# Patient Record
Sex: FEMALE | Race: WHITE | NOT HISPANIC OR LATINO | Employment: OTHER | RURAL
[De-identification: names, ages, dates, MRNs, and addresses within clinical notes are randomized per-mention and may not be internally consistent; named-entity substitution may affect disease eponyms.]

---

## 2021-01-12 ENCOUNTER — OFFICE VISIT CONVERTED (OUTPATIENT)
Dept: CARDIOLOGY | Facility: CLINIC | Age: 86
End: 2021-01-12
Attending: INTERNAL MEDICINE

## 2021-01-12 ENCOUNTER — CONVERSION ENCOUNTER (OUTPATIENT)
Dept: OTHER | Facility: HOSPITAL | Age: 86
End: 2021-01-12

## 2021-02-23 ENCOUNTER — OFFICE VISIT CONVERTED (OUTPATIENT)
Dept: CARDIOLOGY | Facility: CLINIC | Age: 86
End: 2021-02-23
Attending: INTERNAL MEDICINE

## 2021-02-23 ENCOUNTER — CONVERSION ENCOUNTER (OUTPATIENT)
Dept: OTHER | Facility: HOSPITAL | Age: 86
End: 2021-02-23

## 2021-03-23 ENCOUNTER — OFFICE VISIT CONVERTED (OUTPATIENT)
Dept: CARDIOLOGY | Facility: CLINIC | Age: 86
End: 2021-03-23
Attending: INTERNAL MEDICINE

## 2021-05-14 VITALS
SYSTOLIC BLOOD PRESSURE: 206 MMHG | HEART RATE: 68 BPM | HEIGHT: 66 IN | DIASTOLIC BLOOD PRESSURE: 106 MMHG | WEIGHT: 196 LBS | BODY MASS INDEX: 31.5 KG/M2

## 2021-05-14 VITALS
DIASTOLIC BLOOD PRESSURE: 85 MMHG | BODY MASS INDEX: 31.34 KG/M2 | WEIGHT: 195 LBS | SYSTOLIC BLOOD PRESSURE: 140 MMHG | HEART RATE: 62 BPM | HEIGHT: 66 IN

## 2021-05-14 VITALS
BODY MASS INDEX: 31.5 KG/M2 | DIASTOLIC BLOOD PRESSURE: 78 MMHG | SYSTOLIC BLOOD PRESSURE: 151 MMHG | HEIGHT: 66 IN | HEART RATE: 63 BPM | WEIGHT: 196 LBS

## 2021-05-14 NOTE — PROGRESS NOTES
Progress Note      Patient Name: Sol Jung   Patient ID: [id number]   Sex: Female   YOB: 1932    Primary Care Provider: Gissell OCHOA   Referring Provider: Gissell OCHOA    Visit Date: March 23, 2021    Provider: Ammon Gordon MD   Location: [facility]   Location Address: [address] [place] [address]  [id number]          History Of Present Illness  REFERRING CARE PROVIDER: Sarah Macias MD   Sol Jung is a 88 year old female who I saw in the office today for followup evaluation and management of shortness of breath, lower extremity edema, hypertension, and paroxysmal atrial fibrillation. Since the last visit, Sol had pneumonia and was briefly hospitalized at [facility]. She is recovering from this. She feels well. Recently her blood pressure has been better controlled by her home readings. She is compliant with her medications.   PAST MEDICAL HISTORY: Obesity; Hypertension; Paroxysmal atrial fibrillation; Pulmonary hypertension based on previous echocardiogram; GERD; Hysterectomy; Cholecystectomy; Shoulder surgery; back surgery; Esophagus; Hip surgery   PSYCHOSOCIAL HISTORY: Denies alcohol use.   CURRENT MEDICATIONS: Bumex 2 mg p.r.n.; benazepril 40 mg b.i.d.; metoprolol tartrate 50 mg b.i.d.; trazodone 50 mg q. h.s.; Flecainide 50 mg b.i.d.; aspirin 81 mg daily; Macrobid 100 mg 2-3x weekly; citalopram 20 mg daily; Centrum Silver daily; vitamin B12 1,000 mcg daily; hydralazine 50 mg t.i.d.; clonidine 0.1 mg p.r.n. started by the emergency room.      ALLERGIES: No known drug allergies.       Review of Systems  · Cardiovascular  o Admits  o : swelling (feet, ankles, hands), shortness of breath while walking or lying flat  o Denies  o : palpitations (fast, fluttering, or skipping beats), chest pain or angina pectoris   · Respiratory  o Admits  o : chronic or frequent cough      Vitals  Date Time BP Position Site L\R Cuff Size HR RR  "TEMP (F) WT  HT  BMI kg/m2 BSA m2 O2 Sat FR L/min FiO2 HC       03/23/2021 11:38 /85 Sitting    62 - R   194lbs 16oz 5'  6\" 31.47 2.03             Physical Examination  · Constitutional  o Appearance  o : Obese elderly white female, pleasant, in no acute distress.  · Respiratory  o Auscultation of Lungs  o : Clear to auscultation bilaterally. No crackles or rhonchi.  · Cardiovascular  o Heart  o : S1, S2 is normally heard. No S3. No murmur, rubs, or gallops.  · Gastrointestinal  o Abdominal Examination  o : Soft, nontender, nondistended. No free fluid. Bowel sounds heard in all four quadrants.  · Extremities  o Extremities  o : She has 1+ lower extremity edema.   · Labs  o Labs  o : Her laboratory studies were reviewed. Recent BUN 21, creatinine 1.34.  · Data  o Data  o : Emergency room records were reviewed. Primary care records were reviewed.          Assessment     1.  Paroxysmal atrial fibrillation - Maintaining sinus rhythm on flecainide therapy.   2.  Hypertension - Better controlled with recent medication adjustments.   3.  Recent pneumonia - Recovering.          Plan     1.  Discontinue clonidine p.r.n. for hypertension.    2.  I refilled her hydralazine and flecainide today.   3.  No additional cardiac workup is needed at this time.   4.  A routine followup in 6 months will be scheduled.     She is agreeable with this plan.     CLAY Gordon MD  CBD/rt                Electronically Signed by: Lien Swanson-, Other -Author on April 3, 2021 10:59:30 AM  Electronically Co-signed by: Ammon Gordon MD -Reviewer on April 5, 2021 08:53:42 AM  "

## 2021-05-14 NOTE — PROGRESS NOTES
Progress Note      Patient Name: Sol Jung   Patient ID: [id number]   Sex: Female   YOB: 1932    Primary Care Provider: Gissell OCHOA   Referring Provider: Gissell OCHOA    Visit Date: February 23, 2021    Provider: Ammon Gordon MD   Location: [facility]   Location Address: [address] [place] [address]  [id number]          Chief Complaint  · Shortness of breath.  · Lower extremity edema.  · Hypertension.  · Paroxysmal atrial fibrillation.       History Of Present Illness  REFERRING CARE PROVIDER: Gissell OCHOA   Sol Jung is an 88 year old female who presents for who presents for followup evaluation and management of shortness of breath, lower extremity edema, hypertension, and paroxysmal atrial fibrillation. Since the last visit, Sol is doing fairly well. She has much less lower extremity edema. Unfortunately, she has had widely variable blood pressures with some extremely high blood pressures and some neurologic symptoms like speech difficulty and vision changes. She is asymptomatic today. She is accompanied by her daughter. She is compliant with her medications.   PAST MEDICAL & SURGICAL HISTORY: Obesity; Hypertension; Paroxysmal atrial fibrillation; Pulmonary hypertension based on previous echocardiogram; GERD; Hysterectomy; Cholecystectomy; Shoulder surgery; back surgery; Esophagus; Hip surgery.   PSYCHOSOCIAL HISTORY: Denies alcohol use.   CURRENT MEDICATIONS: Bumex 2 mg p.r.n.; benazepril 20 mg b.i.d.; metoprolol tartrate 50 mg b.i.d.; trazodone 50 mg q. h.s.; Flecainide 50 mg b.i.d.; aspirin 81 mg daily; Macrobid 100 mg 2-3x weekly; citalopram 20 mg daily; Centrum Silver daily; vitamin B12 1,000 mcg daily.      ALLERGIES: No known drug allergies.       Review of Systems  · Cardiovascular  o Admits  o : swelling (feet, ankles, hands), shortness of breath while walking or lying flat  o Denies  o : palpitations (fast, fluttering,  "or skipping beats), chest pain or angina pectoris   · Respiratory  o Admits  o : chronic or frequent cough      Vitals  Date Time BP Position Site L\R Cuff Size HR RR TEMP (F) WT  HT  BMI kg/m2 BSA m2 O2 Sat FR L/min FiO2 HC       02/23/2021 01:05 /106 Sitting    68 - R   195lbs 16oz 5'  6\" 31.63 2.03             Physical Examination  · Constitutional  o Appearance  o : Obese, White female, pleasant, in no acute distress.   · Respiratory  o Auscultation of Lungs  o : Clear to auscultation bilaterally. No crackles or rhonchi.  · Cardiovascular  o Heart  o : Soft ejection systolic murmur in the parasternal area.  · Gastrointestinal  o Abdominal Examination  o : Soft, nontender, nondistended. No free fluid. Bowel sounds heard in all four quadrants.  · Extremities  o Extremities  o : 1+ lower extremity edema, much improved from the last visit.  · Data  o Data  o : Followup labs are pending my review.          Assessment     1.  Progressive lower extremity edema with shortness of breath, multifactorial.  It is significantly improved        since last visit after stopping amlodipine and reducing dietary sodium.  She has additionally likely venous        insufficiency, diastolic dysfunction, and sleeps in a recliner often.    2.  Markedly uncontrolled hypertension.  3.  Paroxysmal atrial fibrillation, clinically stable.       Plan     1.  Start hydralazine 25 mg t.i.d.  2.  Continue high-dose benazepril.  I took her off of amlodipine due to fluid retention.  She is on a        beta-blocker, and we may need to switch this at some point to help her blood pressure, but we will try to        see if we can get some improvement with hydralazine.    3.  They will continue home blood pressure readings, which have correlated fairly well with office visits.    4.  I will see her back in 1 month for a blood pressure check.  She is agreeable with this plan.       It is a pleasure to  assist in her care.            CLAY Chandra " MD Evan  CBD:vm             Electronically Signed by: Mariah Cornelius-, Other -Author on March 2, 2021 07:52:47 PM  Electronically Co-signed by: Ammon Gordon MD -Reviewer on March 9, 2021 06:43:44 AM

## 2021-05-14 NOTE — PROGRESS NOTES
Progress Note      Patient Name: Sol Jung   Patient ID: [id number]   Sex: Female   YOB: 1932    Primary Care Provider: Gissell OCHOA   Referring Provider: Gissell OCHOA    Visit Date: January 12, 2021    Provider: Ammon Gordon MD   Location: [facility]   Location Address: [address] [place] [address]  [id number]          Chief Complaint     Shortness of breath, lower extremity edema, hypertension, and paroxysmal atrial fibrillation.       History Of Present Illness  REFERRING CARE PROVIDER: Gissell OCHOA   Sol Jung is an 88 year old female who presents for followup evaluation and management of shortness of breath, lower extremity edema, hypertension, and paroxysmal atrial fibrillation. Sol is changing back to my practice. I last saw her in August 2018. She has had progressive lower extremity edema and increased shortness of breath over many months. She was recently placed on more potent diuretics, which seem to be marginally effective at this point. Further history reveals that she has been sleeping in a recliner for the last couple of years due to back pain when she sleeps. She has been compliant with her other medications. She denies chest pain.   PAST MEDICAL & SURGICAL HISTORY: Obesity; Hypertension; Paroxysmal atrial fibrillation; Pulmonary hypertension based on previous echocardiogram; GERD; Hysterectomy; Cholecystectomy; Shoulder surgery; back surgery; Esophagus; Hip surgery.   PSYCHOSOCIAL HISTORY: No smoking. No alcohol use. Moderate caffeine. .   CURRENT MEDICATIONS: Bumex 2 mg daily; citalopram 20 mg daily; benazepril 20 mg daily; metoprolol tartrate 50 mg b.i.d.; amlodipine 5 mg daily; trazodone 50 mg q. h.s.; Flecainide 50 mg b.i.d.; aspirin 81 mg daily; vitamin D 2000 units daily; Centrum Silver daily; vitamin B12 1,000 mcg daily.      ALLERGIES:  No known drug allergies.       Review of  "Systems  · Cardiovascular  o Admits  o : swelling (feet, ankles, hands), shortness of breath while walking or lying flat  o Denies  o : palpitations (fast, fluttering, or skipping beats), chest pain or angina pectoris   · Respiratory  o Admits  o : chronic or frequent cough      Vitals  Date Time BP Position Site L\R Cuff Size HR RR TEMP (F) WT  HT  BMI kg/m2 BSA m2 O2 Sat FR L/min FiO2 HC       01/12/2021 11:21 /78 Sitting    63 - R   195lbs 16oz 5'  6\" 31.63 2.03       01/12/2021 11:21 /73 Sitting    62 - R                   Physical Examination  · Constitutional  o Appearance  o : Obese, elderly, White female, pleasant, in no acute distress.   · Respiratory  o Auscultation of Lungs  o : Clear to auscultation bilaterally. No crackles or rhonchi.  · Cardiovascular  o Heart  o : Soft ejection systolic murmur in the parasternal area.  · Gastrointestinal  o Abdominal Examination  o : Soft, nontender, nondistended. No free fluid. Bowel sounds heard in all four quadrants.  · Extremities  o Extremities  o : 2+ pitting edema of the lower extremities, below the knee.   · EKG  o EKG  o : EKG in March 2020 showed sinus rhythm, first-degree AV block with left axis deviation.   · Labs  o Labs  o : Laboratory studies reviewed. Recent potassium 4.6. , creatinine 1.2. LFTs normal.  · Echocardiogram  o Echocardiogram  o : Her echocardiogram was received from December 28th. This showed normal biventricular function, ejection fraction 60%, evidence of diastolic dysfunction, mild-to-moderate mitral regurgitation, mild aortic stenosis, mild tricuspid regurgitation, and moderately elevated pulmonary pressures.   · Data  o Data  o : Primary Care records reviewed.          Assessment     ASSESSMENT & PLAN:     1.  Progressive lower extremity edema with shortness of breath.  The lower extremity edema is due to multiple        factors, including venous insufficiency, diastolic dysfunction, sleeping in recliner, possibly " effect of        amlodipine as well.  Her Bumex from dose to dose is effective after generating diuresis, but she does not        mobilize a significant amount of fluid, I believe, because her legs are always in a downward position.         I think multiple different changes need to be made to try to improve the degree of the edema that she is        dealing with.  I have instructed her to continue the current dose of Bumex, which seems adequate.  I am        stopping her amlodipine to eliminate that possible effect.  I have instructed her to reduce her daily sodium        intake to 2,000 mg daily.  She needs to transition back to sleeping in bed with her legs elevated each        night.  Additionally, I recommend compression stockings if she can use them.  I will repeat a basic        metabolic panel and magnesium in 2 weeks and see the patient back in approximately 6 weeks to see if        with these measures taken, we can improve the lower extremity edema.  She and her daughter are        agreeable with this plan.    2.  Regarding her paroxysmal atrial fibrillation, continue Flecainide therapy.  Increase benazepril to 20 mg        b.i.d. to take the place of the amlodipine.        It is a pleasure to assist in her care.  Please let me know if you have any questions regarding her case.      CLAY Gordon MD  CBD:vm             Electronically Signed by: Mariah Cornelius-, Other -Author on January 23, 2021 12:51:31 PM  Electronically Co-signed by: Ammon Gordon MD -Reviewer on February 1, 2021 09:07:18 AM

## 2021-06-16 RX ORDER — FLECAINIDE ACETATE 50 MG/1
50 TABLET ORAL EVERY 12 HOURS
COMMUNITY
End: 2021-06-16 | Stop reason: SDUPTHER

## 2021-06-16 RX ORDER — FLECAINIDE ACETATE 50 MG/1
50 TABLET ORAL EVERY 12 HOURS
Qty: 180 TABLET | Refills: 2 | Status: SHIPPED | OUTPATIENT
Start: 2021-06-16 | End: 2022-01-26 | Stop reason: SDUPTHER

## 2021-06-16 RX ORDER — BENAZEPRIL HYDROCHLORIDE 40 MG/1
TABLET, FILM COATED ORAL
COMMUNITY
Start: 2021-03-24 | End: 2021-06-16 | Stop reason: SDUPTHER

## 2021-06-16 RX ORDER — HYDRALAZINE HYDROCHLORIDE 50 MG/1
50 TABLET, FILM COATED ORAL 3 TIMES DAILY
Qty: 270 TABLET | Refills: 2 | Status: SHIPPED | OUTPATIENT
Start: 2021-06-16 | End: 2021-10-12

## 2021-06-16 RX ORDER — BENAZEPRIL HYDROCHLORIDE 40 MG/1
40 TABLET, FILM COATED ORAL 2 TIMES DAILY
Qty: 180 TABLET | Refills: 2 | Status: SHIPPED | OUTPATIENT
Start: 2021-06-16 | End: 2022-02-28

## 2021-06-16 RX ORDER — HYDRALAZINE HYDROCHLORIDE 50 MG/1
50 TABLET, FILM COATED ORAL 3 TIMES DAILY
COMMUNITY
End: 2021-06-16 | Stop reason: SDUPTHER

## 2021-10-12 ENCOUNTER — OFFICE VISIT (OUTPATIENT)
Dept: CARDIOLOGY | Facility: CLINIC | Age: 86
End: 2021-10-12

## 2021-10-12 VITALS
WEIGHT: 185 LBS | SYSTOLIC BLOOD PRESSURE: 175 MMHG | HEART RATE: 61 BPM | BODY MASS INDEX: 29.73 KG/M2 | HEIGHT: 66 IN | DIASTOLIC BLOOD PRESSURE: 102 MMHG

## 2021-10-12 DIAGNOSIS — I48.0 PAROXYSMAL ATRIAL FIBRILLATION (HCC): ICD-10-CM

## 2021-10-12 DIAGNOSIS — I10 PRIMARY HYPERTENSION: Primary | ICD-10-CM

## 2021-10-12 PROCEDURE — 99214 OFFICE O/P EST MOD 30 MIN: CPT | Performed by: INTERNAL MEDICINE

## 2021-10-12 RX ORDER — CITALOPRAM 20 MG/1
TABLET ORAL
COMMUNITY

## 2021-10-12 RX ORDER — METOPROLOL TARTRATE 50 MG/1
TABLET, FILM COATED ORAL
COMMUNITY
End: 2022-01-04

## 2021-10-12 RX ORDER — HYDRALAZINE HYDROCHLORIDE 50 MG/1
75 TABLET, FILM COATED ORAL 3 TIMES DAILY
Qty: 410 TABLET | Refills: 3 | Status: SHIPPED | OUTPATIENT
Start: 2021-10-12 | End: 2022-05-26 | Stop reason: ALTCHOICE

## 2021-10-12 NOTE — PROGRESS NOTES
Chief Complaint  6 MO F/U    Subjective            Sol Jung presents to [facility] CARDIOLOG[facility]  History of Present Illness    Sol is here for follow-up evaluation management of hypertension, paroxysmal atrial fibrillation.  Since last visit in March clinically she has been stable.  She does measure her blood pressures at home and it sounds as though they are running 150-180 systolic typically.  She is compliant with her medications.  She denies palpitations, chest pain or subjective tachycardia.    PMH  Past Medical History:   Diagnosis Date   • Atrial fibrillation (HCC)    • Heart murmur    • Hypertension          SURGICALHX  Past Surgical History:   Procedure Laterality Date   • ABLATION OF DYSRHYTHMIC FOCUS          SOC  Social History     Socioeconomic History   • Marital status:    Tobacco Use   • Smoking status: Never Smoker   • Smokeless tobacco: Never Used   Vaping Use   • Vaping Use: Never used   Substance and Sexual Activity   • Alcohol use: Not Currently   • Drug use: Never   • Sexual activity: Defer         FAMHX  Family History   Problem Relation Age of Onset   • No Known Problems Mother    • No Known Problems Father           ALLERGY  No Known Allergies     MEDSCURRENT    Current Outpatient Medications:   •  benazepril (LOTENSIN) 40 MG tablet, Take 1 tablet by mouth 2 (two) times a day., Disp: 180 tablet, Rfl: 2  •  citalopram (CeleXA) 20 MG tablet, citalopram 20 mg tablet  TAKE ONE TABLET BY MOUTH EVERY DAY, Disp: , Rfl:   •  flecainide (TAMBOCOR) 50 MG tablet, Take 1 tablet by mouth Every 12 (Twelve) Hours., Disp: 180 tablet, Rfl: 2  •  hydrALAZINE (APRESOLINE) 50 MG tablet, Take 1.5 tablets by mouth 3 (Three) Times a Day for 90 days., Disp: 410 tablet, Rfl: 3  •  metoprolol tartrate (LOPRESSOR) 50 MG tablet, metoprolol tartrate 50 mg tablet  TAKE ONE TABLET BY MOUTH TWICE DAILY, Disp: , Rfl:       Review of Systems   Cardiovascular: Negative for chest pain,  "dyspnea on exertion and palpitations.        Objective     BP (!) 175/102   Pulse 61   Ht 167.6 cm (66\")   Wt 83.9 kg (185 lb)   BMI 29.86 kg/m²       General Appearance:   · well developed  · well nourished  HENT:   · oropharynx moist  · lips not cyanotic  Neck:  · thyroid not enlarged  · supple  Respiratory:  · no respiratory distress  · normal breath sounds  · no rales  Cardiovascular:  · no jugular venous distention  · regular rhythm  · apical impulse normal  · S1 normal, S2 normal  · no S3, no S4   · Grade 2 parasternal systolic murmur  · no rub, no thrill  · carotid pulses normal; no bruit  · pedal pulses normal  · lower extremity edema: Trace lower extremity  Musculoskeletal:  · no clubbing of fingers.   · normocephalic, head atraumatic  Skin:   · warm, dry  Psychiatric:  · judgement and insight appropriate  · normal mood and affect      Result Review :             Data reviewed: Primary care records reviewed     Procedures                  Assessment and Plan        ASSESSMENT:  Encounter Diagnoses   Name Primary?   • Primary hypertension Yes   • Paroxysmal atrial fibrillation (HCC)          PLAN:    1.  Ms. Jung has uncontrolled hypertension.  At no point is she getting low blood pressure readings.  I am increasing her hydralazine to 75 mg 3 times daily.  Continue benazepril and metoprolol.  2.  Continue home blood pressure monitoring  3.  Follow-up 3 months          Patient was given instructions and counseling regarding her condition or for health maintenance advice. Please see specific information pulled into the AVS if appropriate.             FOX Gordon MD  10/12/2021    11:53 EDT  "

## 2022-01-04 ENCOUNTER — OFFICE VISIT (OUTPATIENT)
Dept: CARDIOLOGY | Facility: CLINIC | Age: 87
End: 2022-01-04

## 2022-01-04 VITALS
WEIGHT: 184 LBS | HEIGHT: 66 IN | SYSTOLIC BLOOD PRESSURE: 194 MMHG | HEART RATE: 58 BPM | BODY MASS INDEX: 29.57 KG/M2 | DIASTOLIC BLOOD PRESSURE: 100 MMHG

## 2022-01-04 DIAGNOSIS — R07.89 CHEST PAIN, ATYPICAL: ICD-10-CM

## 2022-01-04 DIAGNOSIS — I10 HYPERTENSION, ESSENTIAL: Primary | ICD-10-CM

## 2022-01-04 DIAGNOSIS — I48.0 PAROXYSMAL ATRIAL FIBRILLATION: ICD-10-CM

## 2022-01-04 PROCEDURE — 99214 OFFICE O/P EST MOD 30 MIN: CPT | Performed by: INTERNAL MEDICINE

## 2022-01-04 RX ORDER — CARVEDILOL 12.5 MG/1
12.5 TABLET ORAL 2 TIMES DAILY WITH MEALS
Qty: 60 TABLET | Refills: 5 | Status: SHIPPED | OUTPATIENT
Start: 2022-01-04 | End: 2022-05-26

## 2022-01-04 RX ORDER — CLONIDINE HYDROCHLORIDE 0.1 MG/1
0.1 TABLET ORAL 2 TIMES DAILY
COMMUNITY
End: 2022-01-04

## 2022-01-04 RX ORDER — HYDROCHLOROTHIAZIDE 12.5 MG/1
12.5 TABLET ORAL DAILY
Qty: 90 TABLET | Refills: 3 | Status: SHIPPED | OUTPATIENT
Start: 2022-01-04 | End: 2022-05-26 | Stop reason: ALTCHOICE

## 2022-01-04 NOTE — PROGRESS NOTES
Chief Complaint  Hypertension (has been in er several times for high BP) and Shortness of Breath (on exertion)    Subjective            Sol Jung presents to [facility] CARDIOLOG[facility]  History of Present Illness    Sol is here for follow-up evaluation management of labile hypertension, paroxysmal atrial fibrillation and atypical chest pain.  She went to the emergency room a few times in the past month with uncontrolled blood pressure with systolics above 200.  She had some discomfort in her upper chest and shoulders and neck.  Her troponin was negative.  EKGs have shown sinus rhythm.  She is compliant with her medications.    PMH  Past Medical History:   Diagnosis Date   • Atrial fibrillation (HCC)    • Heart murmur    • Hypertension          SURGICALHX  Past Surgical History:   Procedure Laterality Date   • ABLATION OF DYSRHYTHMIC FOCUS          SOC  Social History     Socioeconomic History   • Marital status:    Tobacco Use   • Smoking status: Never Smoker   • Smokeless tobacco: Never Used   Vaping Use   • Vaping Use: Never used   Substance and Sexual Activity   • Alcohol use: Not Currently   • Drug use: Never   • Sexual activity: Defer         FAMHX  Family History   Problem Relation Age of Onset   • No Known Problems Mother    • No Known Problems Father           ALLERGY  No Known Allergies     MEDSCURRENT    Current Outpatient Medications:   •  benazepril (LOTENSIN) 40 MG tablet, Take 1 tablet by mouth 2 (two) times a day., Disp: 180 tablet, Rfl: 2  •  citalopram (CeleXA) 20 MG tablet, citalopram 20 mg tablet  TAKE ONE TABLET BY MOUTH EVERY DAY, Disp: , Rfl:   •  flecainide (TAMBOCOR) 50 MG tablet, Take 1 tablet by mouth Every 12 (Twelve) Hours., Disp: 180 tablet, Rfl: 2  •  hydrALAZINE (APRESOLINE) 50 MG tablet, Take 1.5 tablets by mouth 3 (Three) Times a Day for 90 days., Disp: 410 tablet, Rfl: 3  •  Magnesium 400 MG capsule, Take  by mouth., Disp: , Rfl:   •  Potassium 95 MG  "tablet, Take  by mouth., Disp: , Rfl:   •  carvedilol (Coreg) 12.5 MG tablet, Take 1 tablet by mouth 2 (Two) Times a Day With Meals for 30 days., Disp: 60 tablet, Rfl: 5  •  hydroCHLOROthiazide (HYDRODIURIL) 12.5 MG tablet, Take 1 tablet by mouth Daily for 90 days., Disp: 90 tablet, Rfl: 3      Review of Systems   Cardiovascular: Positive for dyspnea on exertion. Negative for chest pain.        Atypical neck and shoulder pain        Objective     BP (!) 194/100   Pulse 58   Ht 167.6 cm (66\")   Wt 83.5 kg (184 lb)   BMI 29.70 kg/m²       General Appearance:   · well developed  · well nourished  HENT:   · oropharynx moist  · lips not cyanotic  Neck:  · thyroid not enlarged  · supple  Respiratory:  · no respiratory distress  · normal breath sounds  · no rales  Cardiovascular:  · no jugular venous distention  · regular rhythm  · apical impulse normal  · S1 normal, S2 normal  · no S3, no S4   · no murmur  · no rub, no thrill  · carotid pulses normal; no bruit  · pedal pulses normal  · lower extremity edema: 1+  Musculoskeletal:  · no clubbing of fingers.   · normocephalic, head atraumatic  Skin:   · warm, dry  Psychiatric:  · judgement and insight appropriate  · normal mood and affect      Result Review :             Data reviewed: Emergency room records reviewed, EKG tracing reviewed by me from December showing sinus rhythm first-degree AV block rate 72, creatinine normal, CBC normal.     Procedures               Assessment and Plan        ASSESSMENT:  Encounter Diagnoses   Name Primary?   • Hypertension, essential Yes   • Paroxysmal atrial fibrillation (HCC)    • Chest pain, atypical          PLAN:    1.  Blood pressure remains uncontrolled.  I am stopping metoprolol and starting carvedilol 12.5 mg twice daily, I am starting HCTZ 12.5 mg daily.  Continue ACE inhibitor and hydralazine.  Discontinue clonidine which she is not taking anyway.  Check basic metabolic panel in 1 week.  2.  Atrial fibrillation controlled " and she is maintaining sinus rhythm.  3.  Follow-up in 3 weeks for blood pressure check and further medication adjustments as needed          Patient was given instructions and counseling regarding her condition or for health maintenance advice. Please see specific information pulled into the AVS if appropriate.             FOX Gordon MD  1/4/2022    11:37 EST

## 2022-01-26 ENCOUNTER — OFFICE VISIT (OUTPATIENT)
Dept: CARDIOLOGY | Facility: CLINIC | Age: 87
End: 2022-01-26

## 2022-01-26 VITALS
HEIGHT: 66 IN | BODY MASS INDEX: 29.73 KG/M2 | WEIGHT: 185 LBS | DIASTOLIC BLOOD PRESSURE: 60 MMHG | SYSTOLIC BLOOD PRESSURE: 114 MMHG | HEART RATE: 71 BPM

## 2022-01-26 DIAGNOSIS — R07.89 CHEST PAIN, ATYPICAL: ICD-10-CM

## 2022-01-26 DIAGNOSIS — F51.01 PRIMARY INSOMNIA: ICD-10-CM

## 2022-01-26 DIAGNOSIS — I10 HYPERTENSION, ESSENTIAL: Primary | ICD-10-CM

## 2022-01-26 DIAGNOSIS — I48.0 PAROXYSMAL ATRIAL FIBRILLATION: ICD-10-CM

## 2022-01-26 PROCEDURE — 99214 OFFICE O/P EST MOD 30 MIN: CPT | Performed by: NURSE PRACTITIONER

## 2022-01-26 RX ORDER — TRAZODONE HYDROCHLORIDE 50 MG/1
50 TABLET ORAL NIGHTLY
COMMUNITY
End: 2022-01-26 | Stop reason: SDUPTHER

## 2022-01-26 RX ORDER — FLECAINIDE ACETATE 50 MG/1
50 TABLET ORAL EVERY 12 HOURS
Qty: 180 TABLET | Refills: 2 | Status: SHIPPED | OUTPATIENT
Start: 2022-01-26 | End: 2022-05-26 | Stop reason: ALTCHOICE

## 2022-01-26 RX ORDER — TRAZODONE HYDROCHLORIDE 100 MG/1
100 TABLET ORAL NIGHTLY
Qty: 30 TABLET | Refills: 11 | Status: SHIPPED | OUTPATIENT
Start: 2022-01-26 | End: 2022-05-26 | Stop reason: ALTCHOICE

## 2022-01-26 NOTE — PROGRESS NOTES
Chief Complaint  Shortness of Breath (3 WEEK FOLLOW UP) and Hypertension    Subjective            Sol Jung presents to [facility] CARDIOLOGY  History of Present Illness    Sol Jung is a 89-year-old white/ female here today for follow-up evaluation and management of labile hypertension, paroxysmal atrial fibrillation, and atypical chest pain.  Since her last office visit, Ms. Jung reports improved blood pressure control with addition of carvedilol to her regimen.  She is overall feeling well, she denies any significant chest pain.  She does have some chronic, stable exertional shortness of breath.  She is compliant with her medications.    PMH  Past Medical History:   Diagnosis Date   • Atrial fibrillation (HCC)    • Heart murmur    • Hypertension          SURGICALHX  Past Surgical History:   Procedure Laterality Date   • ABLATION OF DYSRHYTHMIC FOCUS          SOC  Social History     Socioeconomic History   • Marital status:    Tobacco Use   • Smoking status: Never Smoker   • Smokeless tobacco: Never Used   Vaping Use   • Vaping Use: Never used   Substance and Sexual Activity   • Alcohol use: Not Currently   • Drug use: Never   • Sexual activity: Defer         FAMHX  Family History   Problem Relation Age of Onset   • No Known Problems Mother    • No Known Problems Father           ALLERGY  No Known Allergies     MEDSCURRENT    Current Outpatient Medications:   •  benazepril (LOTENSIN) 40 MG tablet, Take 1 tablet by mouth 2 (two) times a day., Disp: 180 tablet, Rfl: 2  •  carvedilol (Coreg) 12.5 MG tablet, Take 1 tablet by mouth 2 (Two) Times a Day With Meals for 30 days., Disp: 60 tablet, Rfl: 5  •  citalopram (CeleXA) 20 MG tablet, citalopram 20 mg tablet  TAKE ONE TABLET BY MOUTH EVERY DAY, Disp: , Rfl:   •  flecainide (TAMBOCOR) 50 MG tablet, Take 1 tablet by mouth Every 12 (Twelve) Hours., Disp: 180 tablet, Rfl: 2  •  hydrALAZINE (APRESOLINE) 50 MG tablet,  "Take 1.5 tablets by mouth 3 (Three) Times a Day for 90 days., Disp: 410 tablet, Rfl: 3  •  hydroCHLOROthiazide (HYDRODIURIL) 12.5 MG tablet, Take 1 tablet by mouth Daily for 90 days. (Patient taking differently: Take 12.5 mg by mouth Daily. PRN), Disp: 90 tablet, Rfl: 3  •  Magnesium 400 MG capsule, Take  by mouth., Disp: , Rfl:   •  Potassium 95 MG tablet, Take  by mouth., Disp: , Rfl:   •  traZODone (DESYREL) 100 MG tablet, Take 1 tablet by mouth Every Night., Disp: 30 tablet, Rfl: 11      Review of Systems   Constitutional: Positive for malaise/fatigue.   Cardiovascular: Positive for dyspnea on exertion and leg swelling. Negative for chest pain, irregular heartbeat, near-syncope, orthopnea, palpitations and syncope.   Respiratory: Positive for shortness of breath.    Neurological: Positive for excessive daytime sleepiness.   Psychiatric/Behavioral: The patient has insomnia.         Objective     /60   Pulse 71   Ht 167.6 cm (66\")   Wt 83.9 kg (185 lb)   BMI 29.86 kg/m²       General Appearance:   · well developed  · well nourished  HENT:   · oropharynx moist  · lips not cyanotic  Neck:  · thyroid not enlarged  · supple  Respiratory:  · no respiratory distress  · normal breath sounds  · no rales  Cardiovascular:  · no jugular venous distention  · regular rhythm  · apical impulse normal  · S1 normal, S2 normal  · no S3, no S4   · no murmur  · no rub, no thrill  · carotid pulses normal; no bruit  · pedal pulses normal  · lower extremity edema: +1 pitting bilateral feet  Musculoskeletal:  · no clubbing of fingers.   · normocephalic, head atraumatic  Skin:   · warm, dry  Psychiatric:  · judgement and insight appropriate  · normal mood and affect      Result Review :             Data reviewed: Cardiology studies 2020 echocardiogram revealing a normal left ventricular systolic function, ejection fraction 60%, grade 1 diastolic dysfunction.  Mild to moderate mitral regurgitation and mild aortic stenosis with " elevated pulmonary pressures.  EKG from December showing sinus rhythm with first-degree AV block rate 72.  Recent BMP within normal limits.     Procedures      Sol Jung  reports that she has never smoked. She has never used smokeless tobacco.. I have educated her on the risk of diseases from using tobacco products such as cancer, COPD and heart disease.                Assessment and Plan        ASSESSMENT:  Encounter Diagnoses   Name Primary?   • Hypertension, essential Yes   • Paroxysmal atrial fibrillation (HCC)    • Chest pain, atypical    • Primary insomnia          PLAN:    1.  Essential hypertension-Ms. Jung has somewhat labile blood pressures.  In office today, blood pressure is slightly lower than her normal as reported on blood pressure logs.  For now we will keep regimen the same, I have encouraged Ms. Jung to continue checking blood pressure at home and report to the office if she has consistently elevated pressures.  2.  Paroxysmal atrial fibrillation-previous cardiology records indicate that Ms. Jung has a history of atrial flutter with ablation many years ago, and is treated with flecainide.  The notes report that she did not wish to be anticoagulated.  Both EKGs on file show normal sinus rhythm and she is not having any symptoms of palpitations, continue flecainide therapy and beta-blocker.  3.  Atypical chest pain-resolved.  4.  Insomnia-Ms. Jung reports poor sleep about 2 days a week which contributes to daytime fatigue.  We discussed possibly getting a sleep medicine evaluation, wishes to hold off for now.    Ms. Jung is agreeable to the plan of care and will follow up in 4 months unless problems arise.          Patient was given instructions and counseling regarding her condition or for health maintenance advice. Please see specific information pulled into the AVS if appropriate.           Patty Martinez, APRN   1/26/2022  10:56 EST

## 2022-02-28 RX ORDER — BENAZEPRIL HYDROCHLORIDE 40 MG/1
TABLET, FILM COATED ORAL
Qty: 180 TABLET | Refills: 3 | Status: SHIPPED | OUTPATIENT
Start: 2022-02-28 | End: 2022-05-26 | Stop reason: ALTCHOICE

## 2022-03-14 ENCOUNTER — OUTSIDE FACILITY SERVICE (OUTPATIENT)
Dept: CARDIOLOGY | Facility: CLINIC | Age: 87
End: 2022-03-14

## 2022-03-14 PROCEDURE — 99214 OFFICE O/P EST MOD 30 MIN: CPT | Performed by: INTERNAL MEDICINE

## 2022-05-26 ENCOUNTER — OFFICE VISIT (OUTPATIENT)
Dept: CARDIOLOGY | Facility: CLINIC | Age: 87
End: 2022-05-26

## 2022-05-26 VITALS
SYSTOLIC BLOOD PRESSURE: 125 MMHG | HEART RATE: 71 BPM | DIASTOLIC BLOOD PRESSURE: 64 MMHG | WEIGHT: 180 LBS | HEIGHT: 66 IN | BODY MASS INDEX: 28.93 KG/M2

## 2022-05-26 DIAGNOSIS — I25.10 CORONARY ARTERY DISEASE INVOLVING NATIVE CORONARY ARTERY OF NATIVE HEART WITHOUT ANGINA PECTORIS: ICD-10-CM

## 2022-05-26 DIAGNOSIS — I48.0 PAROXYSMAL ATRIAL FIBRILLATION: ICD-10-CM

## 2022-05-26 DIAGNOSIS — R74.01 TRANSAMINITIS: ICD-10-CM

## 2022-05-26 DIAGNOSIS — I10 HYPERTENSION, ESSENTIAL: Primary | ICD-10-CM

## 2022-05-26 DIAGNOSIS — N18.32 STAGE 3B CHRONIC KIDNEY DISEASE: ICD-10-CM

## 2022-05-26 PROCEDURE — 99214 OFFICE O/P EST MOD 30 MIN: CPT | Performed by: NURSE PRACTITIONER

## 2022-05-26 RX ORDER — SOTALOL HYDROCHLORIDE 80 MG/1
80 TABLET ORAL 2 TIMES DAILY
COMMUNITY
End: 2022-05-26 | Stop reason: SDUPTHER

## 2022-05-26 RX ORDER — ASPIRIN 81 MG/1
81 TABLET, CHEWABLE ORAL DAILY
COMMUNITY

## 2022-05-26 RX ORDER — DOCUSATE SODIUM 100 MG/1
100 CAPSULE, LIQUID FILLED ORAL 2 TIMES DAILY
COMMUNITY

## 2022-05-26 RX ORDER — IRON POLYSACCHARIDE COMPLEX 150 MG
150 CAPSULE ORAL 2 TIMES DAILY
COMMUNITY

## 2022-05-26 RX ORDER — MIRTAZAPINE 15 MG/1
15 TABLET, FILM COATED ORAL NIGHTLY
COMMUNITY

## 2022-05-26 RX ORDER — CLOPIDOGREL BISULFATE 75 MG/1
75 TABLET ORAL DAILY
COMMUNITY

## 2022-05-26 RX ORDER — LISINOPRIL 20 MG/1
20 TABLET ORAL DAILY
COMMUNITY

## 2022-05-26 RX ORDER — ONDANSETRON 4 MG/1
4 TABLET, FILM COATED ORAL EVERY 8 HOURS PRN
COMMUNITY

## 2022-05-26 RX ORDER — SOTALOL HYDROCHLORIDE 160 MG/1
160 TABLET ORAL 2 TIMES DAILY
Qty: 180 TABLET | Refills: 3 | Status: SHIPPED | OUTPATIENT
Start: 2022-05-26 | End: 2022-06-06 | Stop reason: SDUPTHER

## 2022-05-26 RX ORDER — ISOSORBIDE MONONITRATE 30 MG/1
30 TABLET, EXTENDED RELEASE ORAL DAILY
COMMUNITY

## 2022-05-26 RX ORDER — ATORVASTATIN CALCIUM 80 MG/1
80 TABLET, FILM COATED ORAL DAILY
COMMUNITY

## 2022-05-26 NOTE — PROGRESS NOTES
Chief Complaint  Hypertension (Follow up)    Subjective            Sol Jung presents to [facility] CARDIOLOGY  History of Present Illness    Sol is a 89-year-old female here today for follow-up evaluation and management of coronary artery disease, with recent ACS and PCI to proximal RCA, proximal LAD in March at [facility], hypertension, paroxysmal atrial fibrillation.  I saw Ms. Jung in the hospital about 10 days after her ACS, where she presented to the ER with continued chest discomfort, jaw pain, neck pain.  At the time, work-up was normal, biomarkers negative, EKG with no acute changes.  The symptoms have since resolved.  However she had ongoing bouts of abdominal pain paired with transaminitis.  She ultimately was found to have a biliary obstruction, she has received a stent in her bile duct since, this is being managed by physicians with [facility].     Today, Sol reports that she is feeling very well.  She denies any further chest pain, shortness of breath, palpitations.  She is compliant with her medications including her dual antiplatelet therapy, no signs of bleeding.    PMH  Past Medical History:   Diagnosis Date   • Atrial fibrillation (HCC)    • Heart murmur    • Hypertension    • Myocardial infarction (HCC)          SURGICALHX  Past Surgical History:   Procedure Laterality Date   • ABLATION OF DYSRHYTHMIC FOCUS     • CAROTID STENT          SOC  Social History     Socioeconomic History   • Marital status:    Tobacco Use   • Smoking status: Never Smoker   • Smokeless tobacco: Never Used   Vaping Use   • Vaping Use: Never used   Substance and Sexual Activity   • Alcohol use: Not Currently   • Drug use: Never   • Sexual activity: Defer         FAMHX  Family History   Problem Relation Age of Onset   • No Known Problems Mother    • No Known Problems Father           ALLERGY  No Known Allergies     MEDSCURRENT    Current Outpatient Medications:   •   "aspirin 81 MG chewable tablet, Chew 81 mg Daily., Disp: , Rfl:   •  atorvastatin (LIPITOR) 80 MG tablet, Take 80 mg by mouth Daily., Disp: , Rfl:   •  citalopram (CeleXA) 20 MG tablet, citalopram 20 mg tablet  TAKE ONE TABLET BY MOUTH EVERY DAY, Disp: , Rfl:   •  clopidogrel (PLAVIX) 75 MG tablet, Take 75 mg by mouth Daily., Disp: , Rfl:   •  docusate sodium (COLACE) 100 MG capsule, Take 100 mg by mouth 2 (Two) Times a Day., Disp: , Rfl:   •  iron polysaccharides (NIFEREX) 150 MG capsule, Take 150 mg by mouth 2 (Two) Times a Day., Disp: , Rfl:   •  isosorbide mononitrate (IMDUR) 30 MG 24 hr tablet, Take 30 mg by mouth Daily., Disp: , Rfl:   •  lisinopril (PRINIVIL,ZESTRIL) 20 MG tablet, Take 20 mg by mouth Daily., Disp: , Rfl:   •  mirtazapine (REMERON) 15 MG tablet, Take 15 mg by mouth Every Night., Disp: , Rfl:   •  ondansetron (ZOFRAN) 4 MG tablet, Take 4 mg by mouth Every 8 (Eight) Hours As Needed for Nausea or Vomiting., Disp: , Rfl:   •  Probiotic Product (PRO-BIOTIC BLEND PO), Take  by mouth., Disp: , Rfl:   •  sotalol (BETAPACE) 160 MG tablet, Take 1 tablet by mouth 2 (Two) Times a Day., Disp: 180 tablet, Rfl: 3      Review of Systems   Constitutional: Negative for malaise/fatigue.   Cardiovascular: Negative for chest pain, dyspnea on exertion, irregular heartbeat, leg swelling, near-syncope, palpitations and syncope.   Respiratory: Positive for wheezing. Negative for shortness of breath.    Hematologic/Lymphatic: Negative for bleeding problem.   Neurological: Negative for dizziness and light-headedness.        Objective     /64   Pulse 71   Ht 167.6 cm (66\")   Wt 81.6 kg (180 lb)   BMI 29.05 kg/m²       General Appearance:   · well developed  · well nourished  HENT:   · oropharynx moist  · lips not cyanotic  Neck:  · thyroid not enlarged  · supple  Respiratory:  · no respiratory distress  · normal breath sounds  · no rales  Cardiovascular:  · no jugular venous distention  · regular " rhythm  · apical impulse normal  · S1 normal, S2 normal  · no S3, no S4   · no murmur  · no rub, no thrill  · carotid pulses normal; no bruit  · pedal pulses normal  · lower extremity edema: none    Musculoskeletal:  · no clubbing of fingers.   · normocephalic, head atraumatic  Skin:   · warm, dry  Psychiatric:  · judgement and insight appropriate  · normal mood and affect      Result Review :                       Data reviewed: Cardiology studies [place] consultation and progress notes reviewed.  Laboratory studies reviewed, creatinine 1 .12. and , .     Procedures      Sol Jung  reports that she has never smoked. She has never used smokeless tobacco.. I have educated her on the risk of diseases from using tobacco products such as cancer, COPD and heart disease.            Assessment and Plan        ASSESSMENT:  Encounter Diagnoses   Name Primary?   • Hypertension, essential Yes   • Paroxysmal atrial fibrillation (HCC)    • Coronary artery disease involving native coronary artery of native heart without angina pectoris    • Transaminitis    • Stage 3b chronic kidney disease (HCC)          PLAN:    1.  Hypertension-Sol was on multiple duplicate therapies, including 2 beta-blockers and 2 ACE inhibitor's.  Today we consolidated her list.  She will continue sotalol and lisinopril.  Sotalol increased today.  She will keep a blood pressure log for the next week and call with results, we will make further adjustments from there if needed.  2.  Paroxysmal atrial fibrillation-clinically maintaining sinus rhythm.  Continue flecainide.  She is not on anticoagulation, she previously expressed she did not want to be on this and she has maintained sinus rhythm for some time.  3.  Coronary artery disease-medically stable, no recurrence of chest pain.  Continue dual antiplatelet therapy and statin.  4.  Elevated transaminases-bile duct obstruction found, she recently received a stent to her bile  duct.  She will have this exchanged in September.   5.  Chronic kidney disease-creatinine stable, scheduled to establish care with nephrologist.    Sol is agreeable to the plan of care and she will follow-up in 3 months unless problems arise.            Patient was given instructions and counseling regarding her condition or for health maintenance advice. Please see specific information pulled into the AVS if appropriate.           Patty Martinez, GABRIELA   5/26/2022  09:52 EDT

## 2022-06-06 ENCOUNTER — TELEPHONE (OUTPATIENT)
Dept: CARDIOLOGY | Facility: CLINIC | Age: 87
End: 2022-06-06

## 2022-06-06 RX ORDER — SOTALOL HYDROCHLORIDE 80 MG/1
80 TABLET ORAL 2 TIMES DAILY
Qty: 180 TABLET | Refills: 3 | Status: SHIPPED | OUTPATIENT
Start: 2022-06-06

## 2022-06-06 NOTE — TELEPHONE ENCOUNTER
Pts daughter is wanting further clarification on the sotalol medication that was changed at her LOV. I read to her what the note said and she said other things were said at the appointment. I explains to her that the medication was increased form the Sotalol 80mg BID to Sotalol 160mg BID. She explained to me that her mother was actually taking Soatalol 80mg and doing 40mg in the morning and 40mg in the evening.    She would like further clarification please.

## 2022-06-06 NOTE — TELEPHONE ENCOUNTER
If the patient was previously only taking 40 mg of sotalol twice daily, please have her take 80 mg twice daily instead of the 160.  So now she should be on sotalol 80 mg twice a day.

## 2022-06-10 ENCOUNTER — TELEPHONE (OUTPATIENT)
Dept: CARDIOLOGY | Facility: CLINIC | Age: 87
End: 2022-06-10

## 2022-06-10 NOTE — TELEPHONE ENCOUNTER
----- Message from GABRIELA Crespo sent at 6/10/2022  8:57 AM EDT -----      ----- Message -----  From: Isabel Saba  Sent: 6/9/2022  12:49 PM EDT  To: GABRIELA Crespo

## 2022-06-15 ENCOUNTER — TELEPHONE (OUTPATIENT)
Dept: CARDIOLOGY | Facility: CLINIC | Age: 87
End: 2022-06-15

## 2022-06-15 NOTE — TELEPHONE ENCOUNTER
----- Message from GABRIELA Crespo sent at 6/10/2022  8:57 AM EDT -----      ----- Message -----  From: sIabel Saba  Sent: 6/9/2022  12:49 PM EDT  To: GABRIELA Crespo

## 2022-08-30 ENCOUNTER — OFFICE VISIT (OUTPATIENT)
Dept: CARDIOLOGY | Facility: CLINIC | Age: 87
End: 2022-08-30

## 2022-08-30 VITALS
BODY MASS INDEX: 28.25 KG/M2 | HEART RATE: 77 BPM | DIASTOLIC BLOOD PRESSURE: 71 MMHG | WEIGHT: 180 LBS | HEIGHT: 67 IN | SYSTOLIC BLOOD PRESSURE: 116 MMHG

## 2022-08-30 DIAGNOSIS — I10 HYPERTENSION, ESSENTIAL: Primary | ICD-10-CM

## 2022-08-30 DIAGNOSIS — I25.10 CORONARY ARTERY DISEASE INVOLVING NATIVE CORONARY ARTERY OF NATIVE HEART WITHOUT ANGINA PECTORIS: ICD-10-CM

## 2022-08-30 DIAGNOSIS — I48.0 PAROXYSMAL ATRIAL FIBRILLATION: ICD-10-CM

## 2022-08-30 PROCEDURE — 99214 OFFICE O/P EST MOD 30 MIN: CPT | Performed by: INTERNAL MEDICINE

## 2022-08-30 RX ORDER — ASCORBIC ACID 500 MG
500 TABLET ORAL DAILY
COMMUNITY
Start: 2022-08-01

## 2022-08-30 NOTE — PROGRESS NOTES
Chief Complaint  Hypertension, Atrial Fibrillation, Coronary Artery Disease, Transaminitis, and Chronic Kidney Disease    Subjective            Sol Jung presents to [facility] CARDIOLOGY    Sol is here for follow-up evaluation management of labile hypertension, paroxysmal atrial fibrillation, coronary artery disease.  Since last visit she has been doing well.  Her blood pressure is controlled.  She did have a UTI recently and her blood pressure was lower than but it seems to have recovered based on her home readings.  No chest pain or lower extremity edema.    PMH  Past Medical History:   Diagnosis Date   • Atrial fibrillation (HCC)    • Heart murmur    • Hypertension    • Myocardial infarction (HCC)          SURGICALHX  Past Surgical History:   Procedure Laterality Date   • ABLATION OF DYSRHYTHMIC FOCUS     • CAROTID STENT          SOC  Social History     Socioeconomic History   • Marital status:    Tobacco Use   • Smoking status: Never Smoker   • Smokeless tobacco: Never Used   Vaping Use   • Vaping Use: Never used   Substance and Sexual Activity   • Alcohol use: Not Currently   • Drug use: Never   • Sexual activity: Defer         FAMHX  Family History   Problem Relation Age of Onset   • No Known Problems Mother    • No Known Problems Father           ALLERGY  No Known Allergies     MEDSCURRENT    Current Outpatient Medications:   •  aspirin 81 MG chewable tablet, Chew 81 mg Daily., Disp: , Rfl:   •  atorvastatin (LIPITOR) 80 MG tablet, Take 80 mg by mouth Daily., Disp: , Rfl:   •  citalopram (CeleXA) 20 MG tablet, citalopram 20 mg tablet  TAKE ONE TABLET BY MOUTH EVERY DAY, Disp: , Rfl:   •  clopidogrel (PLAVIX) 75 MG tablet, Take 75 mg by mouth Daily., Disp: , Rfl:   •  iron polysaccharides (NIFEREX) 150 MG capsule, Take 150 mg by mouth 2 (Two) Times a Day., Disp: , Rfl:   •  isosorbide mononitrate (IMDUR) 30 MG 24 hr tablet, Take 30 mg by mouth Daily., Disp: , Rfl:   •   "lisinopril (PRINIVIL,ZESTRIL) 20 MG tablet, Take 20 mg by mouth Daily., Disp: , Rfl:   •  mirtazapine (REMERON) 15 MG tablet, Take 15 mg by mouth Every Night., Disp: , Rfl:   •  ondansetron (ZOFRAN) 4 MG tablet, Take 4 mg by mouth Every 8 (Eight) Hours As Needed for Nausea or Vomiting., Disp: , Rfl:   •  Potassium 99 MG tablet, Take  by mouth., Disp: , Rfl:   •  Probiotic Product (PRO-BIOTIC BLEND PO), Take  by mouth., Disp: , Rfl:   •  sotalol (BETAPACE) 80 MG tablet, Take 1 tablet by mouth 2 (Two) Times a Day., Disp: 180 tablet, Rfl: 3  •  vitamin C (ASCORBIC ACID) 500 MG tablet, Take 500 mg by mouth Daily., Disp: , Rfl:   •  docusate sodium (COLACE) 100 MG capsule, Take 100 mg by mouth 2 (Two) Times a Day., Disp: , Rfl:       Review of Systems   Cardiovascular: Negative for dyspnea on exertion and leg swelling.   Respiratory: Negative for shortness of breath.         Objective     /71   Pulse 77   Ht 170.2 cm (67\")   Wt 81.6 kg (180 lb)   BMI 28.19 kg/m²       General Appearance:   · well developed  · well nourished  HENT:   · oropharynx moist  · lips not cyanotic  Neck:  · thyroid not enlarged  · supple  Respiratory:  · no respiratory distress  · normal breath sounds  · no rales  Cardiovascular:  · no jugular venous distention  · regular rhythm  · apical impulse normal  · S1 normal, S2 normal  · no S3, no S4   · no murmur  · no rub, no thrill  · carotid pulses normal; no bruit  · pedal pulses normal  · lower extremity edema: 1+  Musculoskeletal:  · no clubbing of fingers.   · normocephalic, head atraumatic  Skin:   · warm, dry  Psychiatric:  · judgement and insight appropriate  · normal mood and affect      Result Review :             Data reviewed: Primary care records reviewed     Procedures               Assessment and Plan        ASSESSMENT:  Encounter Diagnoses   Name Primary?   • Hypertension, essential Yes   • Paroxysmal atrial fibrillation (HCC)    • Coronary artery disease involving native " coronary artery of native heart without angina pectoris          PLAN:    1.  Hypertension, stable, controlled based on home readings, continue current medical therapy.  2.  Atrial fibrillation controlled and she is maintaining sinus rhythm.  3.  Coronary artery disease, clinically stable managed medically, continue current medical therapy    Follow-up in about 7 or 8 months unless problems arise in the meantime          Patient was given instructions and counseling regarding her condition or for health maintenance advice. Please see specific information pulled into the AVS if appropriate.             FOX Gordon MD  8/30/2022    11:27 EDT

## 2023-05-01 ENCOUNTER — OFFICE VISIT (OUTPATIENT)
Dept: CARDIOLOGY | Facility: CLINIC | Age: 88
End: 2023-05-01
Payer: MEDICARE

## 2023-05-01 VITALS
HEART RATE: 58 BPM | SYSTOLIC BLOOD PRESSURE: 137 MMHG | WEIGHT: 180 LBS | HEIGHT: 67 IN | DIASTOLIC BLOOD PRESSURE: 86 MMHG | BODY MASS INDEX: 28.25 KG/M2

## 2023-05-01 DIAGNOSIS — I25.10 CORONARY ARTERY DISEASE INVOLVING NATIVE CORONARY ARTERY OF NATIVE HEART WITHOUT ANGINA PECTORIS: ICD-10-CM

## 2023-05-01 DIAGNOSIS — I10 HYPERTENSION, ESSENTIAL: ICD-10-CM

## 2023-05-01 DIAGNOSIS — I48.0 PAROXYSMAL ATRIAL FIBRILLATION: Primary | ICD-10-CM

## 2023-05-01 NOTE — PROGRESS NOTES
Chief Complaint  Hypertension and Follow-up (Pt states she had a few episodes last week where it felt like someone hit her in the chest, hasnt felt well feels tired)    Subjective            Sol Jung presents to [facility] CARDIOLOGY  History of Present Illness    Sol is here today for follow-up evaluation management of labile hypertension, paroxysmal atrial fibrillation, and coronary artery disease.  Overall she has been doing well since her last office visit however she has had 2 incidences of chest pain.  These both occurred last week.  She felt an intense hitting pressure to her chest only lasting very briefly, a few seconds.  Occurring while at rest.  She had another identical episode a day later.  Since then she has had no recurrence.  She has no exertional symptoms whatsoever.  She is compliant with her medications.  No excessive shortness of breath, palpitations, or dizziness    PMH  Past Medical History:   Diagnosis Date   • Atrial fibrillation    • Heart murmur    • Hypertension    • Myocardial infarction          SURGICALHX  Past Surgical History:   Procedure Laterality Date   • ABLATION OF DYSRHYTHMIC FOCUS     • CAROTID STENT          SOC  Social History     Socioeconomic History   • Marital status:    Tobacco Use   • Smoking status: Never   • Smokeless tobacco: Never   Vaping Use   • Vaping Use: Never used   Substance and Sexual Activity   • Alcohol use: Not Currently   • Drug use: Never   • Sexual activity: Defer         FAMHX  Family History   Problem Relation Age of Onset   • No Known Problems Mother    • No Known Problems Father           ALLERGY  No Known Allergies     MEDSCURRENT    Current Outpatient Medications:   •  aspirin 81 MG chewable tablet, Chew 1 tablet Daily., Disp: , Rfl:   •  atorvastatin (LIPITOR) 80 MG tablet, Take 1 tablet by mouth Daily., Disp: , Rfl:   •  citalopram (CeleXA) 20 MG tablet, citalopram 20 mg tablet  TAKE ONE TABLET BY MOUTH  "EVERY DAY, Disp: , Rfl:   •  clopidogrel (PLAVIX) 75 MG tablet, Take 1 tablet by mouth Daily., Disp: , Rfl:   •  iron polysaccharides (NIFEREX) 150 MG capsule, Take 1 capsule by mouth 2 (Two) Times a Day., Disp: , Rfl:   •  isosorbide mononitrate (IMDUR) 30 MG 24 hr tablet, Take 1 tablet by mouth Daily., Disp: , Rfl:   •  lisinopril (PRINIVIL,ZESTRIL) 20 MG tablet, Take 1 tablet by mouth Daily., Disp: , Rfl:   •  mirtazapine (REMERON) 15 MG tablet, Take 1 tablet by mouth Every Night., Disp: , Rfl:   •  ondansetron (ZOFRAN) 4 MG tablet, Take 1 tablet by mouth Every 8 (Eight) Hours As Needed for Nausea or Vomiting., Disp: , Rfl:   •  Potassium 99 MG tablet, Take  by mouth., Disp: , Rfl:   •  Probiotic Product (PRO-BIOTIC BLEND PO), Take  by mouth., Disp: , Rfl:   •  sotalol (BETAPACE) 80 MG tablet, Take 1 tablet by mouth 2 (Two) Times a Day., Disp: 180 tablet, Rfl: 3  •  vitamin C (ASCORBIC ACID) 500 MG tablet, Take 1 tablet by mouth Daily., Disp: , Rfl:   •  docusate sodium (COLACE) 100 MG capsule, Take 100 mg by mouth 2 (Two) Times a Day., Disp: , Rfl:       ROS     Objective     /86   Pulse 58   Ht 170.2 cm (67\")   Wt 81.6 kg (180 lb)   BMI 28.19 kg/m²       General Appearance:   · well developed  · well nourished  HENT:   · oropharynx moist  · lips not cyanotic  Neck:  · thyroid not enlarged  · supple  Respiratory:  · no respiratory distress  · normal breath sounds  · no rales  Cardiovascular:  · no jugular venous distention  · regular rhythm  · apical impulse normal  · S1 normal, S2 normal  · no S3, no S4   · no murmur  · no rub, no thrill  · carotid pulses normal; no bruit  · pedal pulses normal  · lower extremity edema: none    Musculoskeletal:  · no clubbing of fingers.   · normocephalic, head atraumatic  Skin:   · warm, dry  Psychiatric:  · judgement and insight appropriate  · normal mood and affect      Result Review :                        Procedures      Sol Jung  reports that she has " never smoked. She has never used smokeless tobacco.. I have educated her on the risk of diseases from using tobacco products such as cancer, COPD and heart disease.                 Assessment and Plan        ASSESSMENT:  Encounter Diagnoses   Name Primary?   • Paroxysmal atrial fibrillation Yes   • Hypertension, essential    • Coronary artery disease involving native coronary artery of native heart without angina pectoris          PLAN:    1.  Coronary artery disease-clinically stable.  She had 2 instances of chest pressure last week, atypical as described however with her history I did offer further ischemia testing including SPECT or we could take a watchful waiting approach to see if the pain occurs again.  She is decided to wait and will notify the office if she has any recurrence of the pain at which point we will order the stress testing at that time.  Otherwise continue current medical therapy.  She has nitroglycerin on hand  2.  Essential hypertension-controlled, continue current medical therapy.  3.  Paroxysmal atrial fibrillation-previous ablation years ago.  She is maintaining sinus rhythm.    Ms. Jung is agreeable to the plan of care, she will follow-up in about 6 months unless problems arise.          Patient was given instructions and counseling regarding her condition or for health maintenance advice. Please see specific information pulled into the AVS if appropriate.           Patty Martinez, APRN   5/1/2023  11:22 EDT

## 2023-05-11 RX ORDER — SOTALOL HYDROCHLORIDE 80 MG/1
TABLET ORAL
Qty: 180 TABLET | Refills: 3 | Status: SHIPPED | OUTPATIENT
Start: 2023-05-11

## 2023-07-21 ENCOUNTER — TELEPHONE (OUTPATIENT)
Dept: CARDIOLOGY | Facility: CLINIC | Age: 88
End: 2023-07-21

## 2023-07-21 NOTE — TELEPHONE ENCOUNTER
[facility] received a fax that requires your attention. The document has been indexed to the patient’s chart for your review.      Reason for sending: EXTERNAL MEDICAL RECORD NOTIFICATION     Documents Description: EKG RESULTS     [facility]     Date Indexed: 7.20.23

## 2023-11-07 ENCOUNTER — OFFICE VISIT (OUTPATIENT)
Dept: CARDIOLOGY | Facility: CLINIC | Age: 88
End: 2023-11-07
Payer: MEDICARE

## 2023-11-07 VITALS
BODY MASS INDEX: 28.25 KG/M2 | WEIGHT: 180 LBS | HEIGHT: 67 IN | SYSTOLIC BLOOD PRESSURE: 127 MMHG | DIASTOLIC BLOOD PRESSURE: 76 MMHG | HEART RATE: 62 BPM

## 2023-11-07 DIAGNOSIS — I25.10 CORONARY ARTERY DISEASE INVOLVING NATIVE CORONARY ARTERY OF NATIVE HEART WITHOUT ANGINA PECTORIS: ICD-10-CM

## 2023-11-07 DIAGNOSIS — I10 HYPERTENSION, ESSENTIAL: ICD-10-CM

## 2023-11-07 DIAGNOSIS — I48.0 PAROXYSMAL ATRIAL FIBRILLATION: Primary | ICD-10-CM

## 2023-11-07 PROCEDURE — 99214 OFFICE O/P EST MOD 30 MIN: CPT | Performed by: INTERNAL MEDICINE

## 2023-11-07 PROCEDURE — 1159F MED LIST DOCD IN RCRD: CPT | Performed by: INTERNAL MEDICINE

## 2023-11-07 PROCEDURE — 1160F RVW MEDS BY RX/DR IN RCRD: CPT | Performed by: INTERNAL MEDICINE

## 2023-11-07 RX ORDER — UBIDECARENONE 75 MG
100 CAPSULE ORAL DAILY
COMMUNITY

## 2023-11-07 NOTE — PROGRESS NOTES
Chief Complaint  Hypertension, Atrial Fibrillation, and Follow-up (6 month )    Subjective            Sol Jung presents to [facility] CARDIOLOGY      Sol is here today for follow-up evaluation management of labile hypertension, paroxysmal atrial fibrillation, and coronary artery disease.  Overall she has been doing well since her last office visit .  She denies recurrent chest pain.  No palpitations.  She has been in her usual state of health.  No recent medication changes.    PMH  Past Medical History:   Diagnosis Date    Atrial fibrillation     Heart murmur     Hypertension     Myocardial infarction          SURGICALHX  Past Surgical History:   Procedure Laterality Date    ABLATION OF DYSRHYTHMIC FOCUS      CAROTID STENT          SOC  Social History     Socioeconomic History    Marital status:    Tobacco Use    Smoking status: Never    Smokeless tobacco: Never   Vaping Use    Vaping Use: Never used   Substance and Sexual Activity    Alcohol use: Not Currently    Drug use: Never    Sexual activity: Defer         FAMHX  Family History   Problem Relation Age of Onset    No Known Problems Mother     No Known Problems Father           ALLERGY  No Known Allergies     MEDSCURRENT    Current Outpatient Medications:     aspirin 81 MG chewable tablet, Chew 1 tablet Daily., Disp: , Rfl:     citalopram (CeleXA) 20 MG tablet, citalopram 20 mg tablet  TAKE ONE TABLET BY MOUTH EVERY DAY, Disp: , Rfl:     clopidogrel (PLAVIX) 75 MG tablet, Take 1 tablet by mouth Daily., Disp: , Rfl:     isosorbide mononitrate (IMDUR) 30 MG 24 hr tablet, Take 1 tablet by mouth Daily., Disp: , Rfl:     lisinopril (PRINIVIL,ZESTRIL) 20 MG tablet, Take 1 tablet by mouth Daily., Disp: , Rfl:     ondansetron (ZOFRAN) 4 MG tablet, Take 1 tablet by mouth Every 8 (Eight) Hours As Needed for Nausea or Vomiting., Disp: , Rfl:     Potassium 99 MG tablet, Take  by mouth., Disp: , Rfl:     sotalol (BETAPACE) 80 MG tablet,  "TAKE 1 TABLET TWICE A DAY (DOSE DECREASE), Disp: 180 tablet, Rfl: 3    vitamin B-12 (cyanocobalamin) 100 MCG tablet, Take 1 tablet by mouth Daily., Disp: , Rfl:     vitamin C (ASCORBIC ACID) 500 MG tablet, Take 1 tablet by mouth Daily., Disp: , Rfl:     atorvastatin (LIPITOR) 80 MG tablet, Take 1 tablet by mouth Daily. (Patient not taking: Reported on 11/7/2023), Disp: , Rfl:     docusate sodium (COLACE) 100 MG capsule, Take 100 mg by mouth 2 (Two) Times a Day. (Patient not taking: Reported on 11/7/2023), Disp: , Rfl:     iron polysaccharides (NIFEREX) 150 MG capsule, Take 1 capsule by mouth 2 (Two) Times a Day. (Patient not taking: Reported on 11/7/2023), Disp: , Rfl:     mirtazapine (REMERON) 15 MG tablet, Take 1 tablet by mouth Every Night. (Patient not taking: Reported on 11/7/2023), Disp: , Rfl:     Probiotic Product (PRO-BIOTIC BLEND PO), Take  by mouth. (Patient not taking: Reported on 11/7/2023), Disp: , Rfl:       Review of Systems   Cardiovascular:  Negative for chest pain, dyspnea on exertion and palpitations.   Respiratory:  Negative for shortness of breath.         Objective     /76   Pulse 62   Ht 170.2 cm (67\")   Wt 81.6 kg (180 lb)   BMI 28.19 kg/m²       General Appearance:   well developed  well nourished  HENT:   oropharynx moist  lips not cyanotic  Neck:  thyroid not enlarged  supple  Respiratory:  no respiratory distress  normal breath sounds  no rales  Cardiovascular:  no jugular venous distention  regular rhythm  apical impulse normal  S1 normal, S2 normal  no S3, no S4   no murmur  no rub, no thrill  carotid pulses normal; no bruit  pedal pulses normal  lower extremity edema: none    Musculoskeletal:  no clubbing of fingers.   normocephalic, head atraumatic  Skin:   warm, dry  Psychiatric:  judgement and insight appropriate  normal mood and affect      Result Review :                        Procedures           Assessment and Plan        ASSESSMENT:  Encounter Diagnoses   Name " Primary?    Paroxysmal atrial fibrillation Yes    Hypertension, essential     Coronary artery disease involving native coronary artery of native heart without angina pectoris          PLAN:    1.  Coronary artery disease-clinically stable.  No further chest pain.  Continue current medical therapy.  She was taken off of statin therapy due to advanced age and possible side effects and I would not recommend restarting a statin at this point.  If she has recurrent anginal symptoms we can consider further testing but at this time we are taking a conservative approach in agreement with the patient.  2.  Essential hypertension-controlled, continue current medical therapy.  3.  Paroxysmal atrial fibrillation-previous ablation years ago.  She is maintaining sinus rhythm.    Follow-up annually unless problems arise          Patient was given instructions and counseling regarding her condition or for health maintenance advice. Please see specific information pulled into the AVS if appropriate.           Ammon Gordon MD   11/7/2023  10:59 EST

## 2024-02-15 ENCOUNTER — TELEPHONE (OUTPATIENT)
Dept: CARDIOLOGY | Facility: CLINIC | Age: 89
End: 2024-02-15
Payer: MEDICARE

## 2024-02-16 ENCOUNTER — OFFICE VISIT (OUTPATIENT)
Dept: CARDIOLOGY | Facility: CLINIC | Age: 89
End: 2024-02-16
Payer: MEDICARE

## 2024-02-16 VITALS
SYSTOLIC BLOOD PRESSURE: 159 MMHG | WEIGHT: 180 LBS | DIASTOLIC BLOOD PRESSURE: 101 MMHG | HEART RATE: 69 BPM | HEIGHT: 67 IN | BODY MASS INDEX: 28.25 KG/M2

## 2024-02-16 DIAGNOSIS — I48.0 PAROXYSMAL ATRIAL FIBRILLATION: Primary | ICD-10-CM

## 2024-02-16 DIAGNOSIS — I25.10 CORONARY ARTERY DISEASE INVOLVING NATIVE CORONARY ARTERY OF NATIVE HEART WITHOUT ANGINA PECTORIS: ICD-10-CM

## 2024-02-16 DIAGNOSIS — I10 HYPERTENSION, ESSENTIAL: ICD-10-CM

## 2024-02-16 RX ORDER — NITROGLYCERIN 0.4 MG/1
0.4 TABLET SUBLINGUAL
COMMUNITY

## 2024-02-16 RX ORDER — ISOSORBIDE MONONITRATE 60 MG/1
60 TABLET, EXTENDED RELEASE ORAL DAILY
Qty: 90 TABLET | Refills: 3 | Status: SHIPPED | OUTPATIENT
Start: 2024-02-16

## 2024-05-06 RX ORDER — SOTALOL HYDROCHLORIDE 80 MG/1
TABLET ORAL
Qty: 180 TABLET | Refills: 3 | Status: SHIPPED | OUTPATIENT
Start: 2024-05-06

## 2024-07-11 ENCOUNTER — TELEPHONE (OUTPATIENT)
Dept: CARDIOLOGY | Facility: CLINIC | Age: 89
End: 2024-07-11

## 2024-07-11 NOTE — TELEPHONE ENCOUNTER
Caller: [facility] [place]    Relationship: [facility]    Best call back number: [phone]     What form or medical record are you requesting: PROGRESS NOTE ON 2.16.24    Who is requesting this form or medical record from you: [facility]    How would you like to receive the form or medical records (pick-up, mail, fax):   If fax, what is the fax number: [phone]    Timeframe paperwork needed: TODAY    Additional notes:

## 2024-07-12 ENCOUNTER — TELEPHONE (OUTPATIENT)
Dept: CARDIOLOGY | Facility: CLINIC | Age: 89
End: 2024-07-12
Payer: MEDICARE

## 2024-07-12 NOTE — TELEPHONE ENCOUNTER
Caller: KIM     Relationship: [facility]    Best call back number: [phone]    What form or medical record are you requesting: PROGRESS NOT FROM 02.16.2024    Who is requesting this form or medical record from you: [facility]     How would you like to receive the form or medical records (pick-up, mail, fax): FAX  If fax, what is the fax number: [phone]    Timeframe paperwork needed: ASAP

## 2024-09-03 ENCOUNTER — OFFICE VISIT (OUTPATIENT)
Dept: CARDIOLOGY | Facility: CLINIC | Age: 89
End: 2024-09-03
Payer: MEDICARE

## 2024-09-03 VITALS
WEIGHT: 170 LBS | SYSTOLIC BLOOD PRESSURE: 127 MMHG | HEART RATE: 67 BPM | BODY MASS INDEX: 26.68 KG/M2 | HEIGHT: 67 IN | DIASTOLIC BLOOD PRESSURE: 72 MMHG

## 2024-09-03 DIAGNOSIS — I25.10 CORONARY ARTERY DISEASE INVOLVING NATIVE CORONARY ARTERY OF NATIVE HEART WITHOUT ANGINA PECTORIS: ICD-10-CM

## 2024-09-03 DIAGNOSIS — I48.0 PAROXYSMAL ATRIAL FIBRILLATION: Primary | ICD-10-CM

## 2024-09-03 DIAGNOSIS — I10 HYPERTENSION, ESSENTIAL: ICD-10-CM

## 2024-09-03 PROCEDURE — 99214 OFFICE O/P EST MOD 30 MIN: CPT | Performed by: INTERNAL MEDICINE

## 2024-09-03 PROCEDURE — 1159F MED LIST DOCD IN RCRD: CPT | Performed by: INTERNAL MEDICINE

## 2024-09-03 PROCEDURE — 1160F RVW MEDS BY RX/DR IN RCRD: CPT | Performed by: INTERNAL MEDICINE

## 2024-09-03 RX ORDER — CHOLECALCIFEROL (VITAMIN D3) 25 MCG
1000 TABLET ORAL DAILY
COMMUNITY

## 2024-09-03 RX ORDER — CLOPIDOGREL BISULFATE 75 MG/1
75 TABLET ORAL DAILY
Qty: 90 TABLET | Refills: 3 | Status: SHIPPED | OUTPATIENT
Start: 2024-09-03

## 2024-09-03 RX ORDER — AMLODIPINE BESYLATE 5 MG/1
1 TABLET ORAL DAILY
COMMUNITY
Start: 2024-08-28

## 2024-09-03 RX ORDER — PANTOPRAZOLE SODIUM 20 MG/1
20 TABLET, DELAYED RELEASE ORAL DAILY
COMMUNITY

## 2024-09-03 RX ORDER — AMITRIPTYLINE HYDROCHLORIDE 10 MG/1
10 TABLET ORAL
COMMUNITY
Start: 2024-08-28

## 2024-09-03 NOTE — PROGRESS NOTES
Chief Complaint  Atrial Fibrillation and Follow-up    Subjective            Sol Jung presents to [facility] CARDIOLOG[facility]      Ms. Jung is here for follow-up evaluation management of labile hypertension, paroxysmal atrial fibrillation, coronary artery disease.  She was hospitalized last week with nausea, hyponatremia.  Really nothing acutely cardiac.  Her sotalol dose was adjusted and consultation with cardiology to 40 mg twice a day.  Since discharge she is doing relatively well.  She has not been on anticoagulation long-term due to personal preference and previous history of bleeding.  She denies palpitations or excessive shortness of breath.  No edema.  She is compliant with her medical therapy.  She is subjectively feeling better.    PMH  Past Medical History:   Diagnosis Date    Atrial fibrillation     Heart murmur     Hypertension     Myocardial infarction          SURGICALHX  Past Surgical History:   Procedure Laterality Date    ABLATION OF DYSRHYTHMIC FOCUS      CAROTID STENT          SOC  Social History     Socioeconomic History    Marital status:    Tobacco Use    Smoking status: Never    Smokeless tobacco: Never   Vaping Use    Vaping status: Never Used   Substance and Sexual Activity    Alcohol use: Not Currently    Drug use: Never    Sexual activity: Defer         FAMHX  Family History   Problem Relation Age of Onset    No Known Problems Mother     No Known Problems Father           ALLERGY  No Known Allergies     MEDSCURRENT    Current Outpatient Medications:     amitriptyline (ELAVIL) 10 MG tablet, Take 1 tablet by mouth every night at bedtime., Disp: , Rfl:     amLODIPine (NORVASC) 5 MG tablet, Take 1 tablet by mouth Daily., Disp: , Rfl:     BACILLUS COAGULANS-INULIN PO, Take  by mouth., Disp: , Rfl:     Cholecalciferol 25 MCG (1000 UT) tablet, Take 1 tablet by mouth Daily., Disp: , Rfl:     citalopram (CeleXA) 10 MG tablet, Take 1 tablet by mouth Daily., Disp: ,  Rfl:     clopidogrel (PLAVIX) 75 MG tablet, Take 1 tablet by mouth Daily., Disp: 90 tablet, Rfl: 3    Cyanocobalamin (Vitamin B-12) 500 MCG lozenge, Take 100 mcg by mouth Daily., Disp: , Rfl:     FAMOTIDINE PO, Take  by mouth., Disp: , Rfl:     isosorbide mononitrate (IMDUR) 60 MG 24 hr tablet, Take 1 tablet by mouth Daily., Disp: 90 tablet, Rfl: 3    lisinopril (PRINIVIL,ZESTRIL) 20 MG tablet, Take 1 tablet by mouth Daily., Disp: , Rfl:     magnesium chloride ER 64 MG DR tablet, Take  by mouth Daily., Disp: , Rfl:     mirtazapine (REMERON) 15 MG tablet, Take 1 tablet by mouth Every Night., Disp: , Rfl:     nitroglycerin (Nitrostat) 0.4 MG SL tablet, Take 1 tablet by mouth Every 5 (Five) Minutes As Needed., Disp: , Rfl:     pantoprazole (PROTONIX) 20 MG EC tablet, Take 1 tablet by mouth Daily., Disp: , Rfl:     Potassium 99 MG tablet, Take  by mouth., Disp: , Rfl:     aspirin 81 MG chewable tablet, Chew 1 tablet Daily. (Patient not taking: Reported on 2/16/2024), Disp: , Rfl:     atorvastatin (LIPITOR) 80 MG tablet, Take 1 tablet by mouth Daily. (Patient not taking: Reported on 11/7/2023), Disp: , Rfl:     docusate sodium (COLACE) 100 MG capsule, Take 100 mg by mouth 2 (Two) Times a Day. (Patient not taking: Reported on 11/7/2023), Disp: , Rfl:     iron polysaccharides (NIFEREX) 150 MG capsule, Take 1 capsule by mouth 2 (Two) Times a Day. (Patient not taking: Reported on 11/7/2023), Disp: , Rfl:     ondansetron (ZOFRAN) 4 MG tablet, Take 1 tablet by mouth Every 8 (Eight) Hours As Needed for Nausea or Vomiting. (Patient not taking: Reported on 2/16/2024), Disp: , Rfl:     Probiotic Product (PRO-BIOTIC BLEND PO), Take  by mouth. (Patient not taking: Reported on 11/7/2023), Disp: , Rfl:     sotalol (BETAPACE) 80 MG tablet, TAKE 1 TABLET TWICE A DAY (DOSE DECREASE) (Patient not taking: Reported on 9/3/2024), Disp: 180 tablet, Rfl: 3    vitamin C (ASCORBIC ACID) 500 MG tablet, Take 1 tablet by mouth Daily. (Patient not  "taking: Reported on 2/16/2024), Disp: , Rfl:       Review of Systems   Constitutional: Positive for malaise/fatigue.   Eyes:  Negative for blurred vision.   Cardiovascular:  Negative for chest pain, leg swelling, near-syncope, orthopnea, palpitations and syncope.   Respiratory:  Positive for shortness of breath.    Hematologic/Lymphatic: Bruises/bleeds easily.   Neurological:  Negative for headaches.        Objective     /72   Pulse 67   Ht 170.2 cm (67\")   Wt 77.1 kg (170 lb)   BMI 26.63 kg/m²       General Appearance:   well developed  well nourished  HENT:   oropharynx moist  lips not cyanotic  Neck:  thyroid not enlarged  supple  Respiratory:  no respiratory distress  normal breath sounds  no rales  Cardiovascular:  no jugular venous distention  regular rhythm  apical impulse normal  S1 normal, S2 normal  no S3, no S4   Grade 2 parasternal systolic murmur  no rub, no thrill  carotid pulses normal; no bruit  pedal pulses normal  lower extremity edema: Trace  Musculoskeletal:  no clubbing of fingers.   normocephalic, head atraumatic  Skin:   warm, dry  Psychiatric:  judgement and insight appropriate  normal mood and affect      Result Review :     The following data was reviewed by: Ammon Gordon MD on 02/16/2024:              Data reviewed : Hospital records laboratory studies reviewed      Procedures           Assessment and Plan        ASSESSMENT:  Encounter Diagnoses   Name Primary?    Paroxysmal atrial fibrillation Yes    Hypertension, essential     Coronary artery disease involving native coronary artery of native heart without angina pectoris          PLAN:    1.  Paroxysmal atrial fibrillation-maintaining sinus rhythm.  Continue low-dose 40 mg twice daily dosing of sotalol.  She is not on anticoagulation due to personal preference and previous bleeding complication.  No need for aspirin  2.  Essential hypertension-controlled, continue current medical therapy  3.  Coronary artery " disease-clinically stable.  Continue Plavix for secondary prevention, no need for aspirin therapy    Follow-up as scheduled in November      Patient was given instructions and counseling regarding her condition or for health maintenance advice. Please see specific information pulled into the AVS if appropriate.           Ammon Gordon MD   9/3/2024  12:49 EDT

## 2024-09-04 ENCOUNTER — TELEPHONE (OUTPATIENT)
Dept: CARDIOLOGY | Facility: CLINIC | Age: 89
End: 2024-09-04
Payer: MEDICARE

## 2024-09-04 NOTE — TELEPHONE ENCOUNTER
Received Vm from patient daughter for clarification on Aspirin    SW daughter. Advised per Dr Gordon  note no Aspirin needed. Daughter voiced understanding

## 2024-10-04 ENCOUNTER — APPOINTMENT (OUTPATIENT)
Dept: GENERAL RADIOLOGY | Facility: HOSPITAL | Age: 89
End: 2024-10-04
Payer: MEDICARE

## 2024-10-04 ENCOUNTER — APPOINTMENT (OUTPATIENT)
Dept: CT IMAGING | Facility: HOSPITAL | Age: 89
End: 2024-10-04
Payer: MEDICARE

## 2024-10-04 ENCOUNTER — HOSPITAL ENCOUNTER (OUTPATIENT)
Facility: HOSPITAL | Age: 89
Setting detail: OBSERVATION
Discharge: HOME | End: 2024-10-05
Attending: EMERGENCY MEDICINE | Admitting: INTERNAL MEDICINE
Payer: MEDICARE

## 2024-10-04 DIAGNOSIS — I48.0 PAROXYSMAL A-FIB: ICD-10-CM

## 2024-10-04 DIAGNOSIS — Z86.79 HISTORY OF CORONARY ARTERY DISEASE: ICD-10-CM

## 2024-10-04 DIAGNOSIS — R40.20 LOSS OF CONSCIOUSNESS: Primary | ICD-10-CM

## 2024-10-04 DIAGNOSIS — R91.1 PULMONARY NODULE: ICD-10-CM

## 2024-10-04 DIAGNOSIS — N28.9 RENAL INSUFFICIENCY: ICD-10-CM

## 2024-10-04 LAB
ALBUMIN SERPL-MCNC: 4.2 G/DL (ref 3.5–5.2)
ALBUMIN/GLOB SERPL: 1.2 G/DL
ALP SERPL-CCNC: 58 U/L (ref 39–117)
ALT SERPL W P-5'-P-CCNC: 21 U/L (ref 1–33)
ANION GAP SERPL CALCULATED.3IONS-SCNC: 8 MMOL/L (ref 5–15)
AST SERPL-CCNC: 27 U/L (ref 1–32)
BACTERIA UR QL AUTO: ABNORMAL /HPF
BASOPHILS # BLD AUTO: 0.06 10*3/MM3 (ref 0–0.2)
BASOPHILS NFR BLD AUTO: 0.8 % (ref 0–1.5)
BILIRUB SERPL-MCNC: 0.9 MG/DL (ref 0–1.2)
BILIRUB UR QL STRIP: NEGATIVE
BUN SERPL-MCNC: 24 MG/DL (ref 8–23)
BUN/CREAT SERPL: 18.9 (ref 7–25)
CALCIUM SPEC-SCNC: 9.3 MG/DL (ref 8.2–9.6)
CHLORIDE SERPL-SCNC: 97 MMOL/L (ref 98–107)
CLARITY UR: ABNORMAL
CO2 SERPL-SCNC: 24 MMOL/L (ref 22–29)
COLOR UR: YELLOW
CREAT SERPL-MCNC: 1.27 MG/DL (ref 0.57–1)
D DIMER PPP FEU-MCNC: 3.13 MCGFEU/ML (ref 0–0.92)
DEPRECATED RDW RBC AUTO: 42.6 FL (ref 37–54)
EGFRCR SERPLBLD CKD-EPI 2021: 39.8 ML/MIN/1.73
EOSINOPHIL # BLD AUTO: 0.24 10*3/MM3 (ref 0–0.4)
EOSINOPHIL NFR BLD AUTO: 3.1 % (ref 0.3–6.2)
ERYTHROCYTE [DISTWIDTH] IN BLOOD BY AUTOMATED COUNT: 12.1 % (ref 12.3–15.4)
GLOBULIN UR ELPH-MCNC: 3.5 GM/DL
GLUCOSE SERPL-MCNC: 102 MG/DL (ref 65–99)
GLUCOSE UR STRIP-MCNC: NEGATIVE MG/DL
HCT VFR BLD AUTO: 39.8 % (ref 34–46.6)
HGB BLD-MCNC: 14.1 G/DL (ref 12–15.9)
HGB UR QL STRIP.AUTO: NEGATIVE
HOLD SPECIMEN: NORMAL
HYALINE CASTS UR QL AUTO: ABNORMAL /LPF
IMM GRANULOCYTES # BLD AUTO: 0.06 10*3/MM3 (ref 0–0.05)
IMM GRANULOCYTES NFR BLD AUTO: 0.8 % (ref 0–0.5)
KETONES UR QL STRIP: NEGATIVE
LEUKOCYTE ESTERASE UR QL STRIP.AUTO: ABNORMAL
LYMPHOCYTES # BLD AUTO: 1.99 10*3/MM3 (ref 0.7–3.1)
LYMPHOCYTES NFR BLD AUTO: 25.7 % (ref 19.6–45.3)
MAGNESIUM SERPL-MCNC: 1.8 MG/DL (ref 1.7–2.3)
MCH RBC QN AUTO: 34.1 PG (ref 26.6–33)
MCHC RBC AUTO-ENTMCNC: 35.4 G/DL (ref 31.5–35.7)
MCV RBC AUTO: 96.1 FL (ref 79–97)
MONOCYTES # BLD AUTO: 0.43 10*3/MM3 (ref 0.1–0.9)
MONOCYTES NFR BLD AUTO: 5.6 % (ref 5–12)
NEUTROPHILS NFR BLD AUTO: 4.95 10*3/MM3 (ref 1.7–7)
NEUTROPHILS NFR BLD AUTO: 64 % (ref 42.7–76)
NITRITE UR QL STRIP: NEGATIVE
NRBC BLD AUTO-RTO: 0 /100 WBC (ref 0–0.2)
PH UR STRIP.AUTO: 7 [PH] (ref 5–8)
PLATELET # BLD AUTO: 222 10*3/MM3 (ref 140–450)
PMV BLD AUTO: 9.1 FL (ref 6–12)
POTASSIUM SERPL-SCNC: 4.2 MMOL/L (ref 3.5–5.2)
PROT SERPL-MCNC: 7.7 G/DL (ref 6–8.5)
PROT UR QL STRIP: NEGATIVE
QT INTERVAL: 414 MS
QT INTERVAL: 462 MS
QTC INTERVAL: 465 MS
QTC INTERVAL: 480 MS
RBC # BLD AUTO: 4.14 10*6/MM3 (ref 3.77–5.28)
RBC # UR STRIP: ABNORMAL /HPF
REF LAB TEST METHOD: ABNORMAL
SODIUM SERPL-SCNC: 129 MMOL/L (ref 136–145)
SP GR UR STRIP: 1.02 (ref 1–1.03)
SQUAMOUS #/AREA URNS HPF: ABNORMAL /HPF
TROPONIN T SERPL HS-MCNC: 39 NG/L
TROPONIN T SERPL HS-MCNC: 45 NG/L
UROBILINOGEN UR QL STRIP: ABNORMAL
WBC # UR STRIP: ABNORMAL /HPF
WBC NRBC COR # BLD AUTO: 7.73 10*3/MM3 (ref 3.4–10.8)
WHOLE BLOOD HOLD COAG: NORMAL
WHOLE BLOOD HOLD SPECIMEN: NORMAL

## 2024-10-04 PROCEDURE — 71045 X-RAY EXAM CHEST 1 VIEW: CPT

## 2024-10-04 PROCEDURE — G0378 HOSPITAL OBSERVATION PER HR: HCPCS

## 2024-10-04 PROCEDURE — 99222 1ST HOSP IP/OBS MODERATE 55: CPT | Performed by: INTERNAL MEDICINE

## 2024-10-04 PROCEDURE — 99285 EMERGENCY DEPT VISIT HI MDM: CPT

## 2024-10-04 PROCEDURE — 71275 CT ANGIOGRAPHY CHEST: CPT

## 2024-10-04 PROCEDURE — 85025 COMPLETE CBC W/AUTO DIFF WBC: CPT | Performed by: EMERGENCY MEDICINE

## 2024-10-04 PROCEDURE — 84484 ASSAY OF TROPONIN QUANT: CPT | Performed by: EMERGENCY MEDICINE

## 2024-10-04 PROCEDURE — 25810000003 SODIUM CHLORIDE 0.9 % SOLUTION: Performed by: EMERGENCY MEDICINE

## 2024-10-04 PROCEDURE — 83735 ASSAY OF MAGNESIUM: CPT | Performed by: EMERGENCY MEDICINE

## 2024-10-04 PROCEDURE — 25510000001 IOPAMIDOL PER 1 ML: Performed by: EMERGENCY MEDICINE

## 2024-10-04 PROCEDURE — 70450 CT HEAD/BRAIN W/O DYE: CPT

## 2024-10-04 PROCEDURE — 81001 URINALYSIS AUTO W/SCOPE: CPT | Performed by: EMERGENCY MEDICINE

## 2024-10-04 PROCEDURE — 25810000003 SODIUM CHLORIDE 0.9 % SOLUTION: Performed by: INTERNAL MEDICINE

## 2024-10-04 PROCEDURE — 93010 ELECTROCARDIOGRAM REPORT: CPT | Performed by: STUDENT IN AN ORGANIZED HEALTH CARE EDUCATION/TRAINING PROGRAM

## 2024-10-04 PROCEDURE — 80053 COMPREHEN METABOLIC PANEL: CPT | Performed by: EMERGENCY MEDICINE

## 2024-10-04 PROCEDURE — 36415 COLL VENOUS BLD VENIPUNCTURE: CPT

## 2024-10-04 PROCEDURE — 85379 FIBRIN DEGRADATION QUANT: CPT | Performed by: EMERGENCY MEDICINE

## 2024-10-04 PROCEDURE — 93005 ELECTROCARDIOGRAM TRACING: CPT

## 2024-10-04 PROCEDURE — 93005 ELECTROCARDIOGRAM TRACING: CPT | Performed by: INTERNAL MEDICINE

## 2024-10-04 PROCEDURE — 93005 ELECTROCARDIOGRAM TRACING: CPT | Performed by: EMERGENCY MEDICINE

## 2024-10-04 RX ORDER — ONDANSETRON 4 MG/1
4 TABLET, ORALLY DISINTEGRATING ORAL EVERY 6 HOURS PRN
Status: DISCONTINUED | OUTPATIENT
Start: 2024-10-04 | End: 2024-10-05 | Stop reason: HOSPADM

## 2024-10-04 RX ORDER — TRAZODONE HYDROCHLORIDE 100 MG/1
100 TABLET ORAL NIGHTLY
COMMUNITY

## 2024-10-04 RX ORDER — LORAZEPAM 0.5 MG/1
0.5 TABLET ORAL EVERY 8 HOURS PRN
Status: DISCONTINUED | OUTPATIENT
Start: 2024-10-04 | End: 2024-10-05 | Stop reason: HOSPADM

## 2024-10-04 RX ORDER — SODIUM CHLORIDE 9 MG/ML
40 INJECTION, SOLUTION INTRAVENOUS AS NEEDED
Status: DISCONTINUED | OUTPATIENT
Start: 2024-10-04 | End: 2024-10-05 | Stop reason: HOSPADM

## 2024-10-04 RX ORDER — AMOXICILLIN 250 MG
2 CAPSULE ORAL 2 TIMES DAILY PRN
Status: DISCONTINUED | OUTPATIENT
Start: 2024-10-04 | End: 2024-10-05 | Stop reason: HOSPADM

## 2024-10-04 RX ORDER — IOPAMIDOL 755 MG/ML
75 INJECTION, SOLUTION INTRAVASCULAR
Status: COMPLETED | OUTPATIENT
Start: 2024-10-04 | End: 2024-10-04

## 2024-10-04 RX ORDER — SOTALOL HYDROCHLORIDE 80 MG/1
80 TABLET ORAL EVERY 12 HOURS SCHEDULED
Status: DISCONTINUED | OUTPATIENT
Start: 2024-10-04 | End: 2024-10-05

## 2024-10-04 RX ORDER — AMLODIPINE BESYLATE 5 MG/1
5 TABLET ORAL DAILY
Status: DISCONTINUED | OUTPATIENT
Start: 2024-10-04 | End: 2024-10-05 | Stop reason: HOSPADM

## 2024-10-04 RX ORDER — UBIDECARENONE 75 MG
50 CAPSULE ORAL DAILY
Status: DISCONTINUED | OUTPATIENT
Start: 2024-10-05 | End: 2024-10-05 | Stop reason: HOSPADM

## 2024-10-04 RX ORDER — ATORVASTATIN CALCIUM 40 MG/1
80 TABLET, FILM COATED ORAL DAILY
Status: DISCONTINUED | OUTPATIENT
Start: 2024-10-05 | End: 2024-10-05 | Stop reason: HOSPADM

## 2024-10-04 RX ORDER — CALCIUM CARBONATE 500 MG/1
1 TABLET, CHEWABLE ORAL 2 TIMES DAILY PRN
Status: DISCONTINUED | OUTPATIENT
Start: 2024-10-04 | End: 2024-10-05 | Stop reason: HOSPADM

## 2024-10-04 RX ORDER — SOTALOL HYDROCHLORIDE 80 MG/1
40 TABLET ORAL 2 TIMES DAILY
COMMUNITY

## 2024-10-04 RX ORDER — ONDANSETRON 2 MG/ML
4 INJECTION INTRAMUSCULAR; INTRAVENOUS EVERY 6 HOURS PRN
Status: DISCONTINUED | OUTPATIENT
Start: 2024-10-04 | End: 2024-10-05 | Stop reason: HOSPADM

## 2024-10-04 RX ORDER — ACETAMINOPHEN 160 MG/5ML
650 SOLUTION ORAL EVERY 4 HOURS PRN
Status: DISCONTINUED | OUTPATIENT
Start: 2024-10-04 | End: 2024-10-05 | Stop reason: HOSPADM

## 2024-10-04 RX ORDER — BISACODYL 5 MG/1
5 TABLET, DELAYED RELEASE ORAL DAILY PRN
Status: DISCONTINUED | OUTPATIENT
Start: 2024-10-04 | End: 2024-10-05 | Stop reason: HOSPADM

## 2024-10-04 RX ORDER — CLOPIDOGREL BISULFATE 75 MG/1
75 TABLET ORAL DAILY
Status: DISCONTINUED | OUTPATIENT
Start: 2024-10-05 | End: 2024-10-05 | Stop reason: HOSPADM

## 2024-10-04 RX ORDER — SODIUM CHLORIDE 0.9 % (FLUSH) 0.9 %
10 SYRINGE (ML) INJECTION AS NEEDED
Status: DISCONTINUED | OUTPATIENT
Start: 2024-10-04 | End: 2024-10-05 | Stop reason: HOSPADM

## 2024-10-04 RX ORDER — SODIUM CHLORIDE 0.9 % (FLUSH) 0.9 %
10 SYRINGE (ML) INJECTION EVERY 12 HOURS SCHEDULED
Status: DISCONTINUED | OUTPATIENT
Start: 2024-10-04 | End: 2024-10-05 | Stop reason: HOSPADM

## 2024-10-04 RX ORDER — LISINOPRIL 10 MG/1
20 TABLET ORAL DAILY
Status: DISCONTINUED | OUTPATIENT
Start: 2024-10-05 | End: 2024-10-05 | Stop reason: HOSPADM

## 2024-10-04 RX ORDER — ASPIRIN 81 MG/1
81 TABLET, CHEWABLE ORAL DAILY
Status: DISCONTINUED | OUTPATIENT
Start: 2024-10-04 | End: 2024-10-05 | Stop reason: HOSPADM

## 2024-10-04 RX ORDER — SODIUM CHLORIDE 9 MG/ML
75 INJECTION, SOLUTION INTRAVENOUS CONTINUOUS
Status: DISCONTINUED | OUTPATIENT
Start: 2024-10-04 | End: 2024-10-05 | Stop reason: HOSPADM

## 2024-10-04 RX ORDER — CITALOPRAM HYDROBROMIDE 10 MG/1
10 TABLET ORAL DAILY
Status: DISCONTINUED | OUTPATIENT
Start: 2024-10-05 | End: 2024-10-05 | Stop reason: HOSPADM

## 2024-10-04 RX ORDER — POTASSIUM CHLORIDE 750 MG/1
10 CAPSULE, EXTENDED RELEASE ORAL DAILY
COMMUNITY

## 2024-10-04 RX ORDER — ACETAMINOPHEN 325 MG/1
650 TABLET ORAL EVERY 4 HOURS PRN
Status: DISCONTINUED | OUTPATIENT
Start: 2024-10-04 | End: 2024-10-05 | Stop reason: HOSPADM

## 2024-10-04 RX ORDER — AMITRIPTYLINE HYDROCHLORIDE 10 MG/1
10 TABLET ORAL NIGHTLY
Status: DISCONTINUED | OUTPATIENT
Start: 2024-10-04 | End: 2024-10-05 | Stop reason: HOSPADM

## 2024-10-04 RX ORDER — ACETAMINOPHEN 650 MG/1
650 SUPPOSITORY RECTAL EVERY 4 HOURS PRN
Status: DISCONTINUED | OUTPATIENT
Start: 2024-10-04 | End: 2024-10-05 | Stop reason: HOSPADM

## 2024-10-04 RX ORDER — NITROGLYCERIN 0.4 MG/1
0.4 TABLET SUBLINGUAL
Status: DISCONTINUED | OUTPATIENT
Start: 2024-10-04 | End: 2024-10-05 | Stop reason: HOSPADM

## 2024-10-04 RX ORDER — MAGNESIUM CHLORIDE 71.5 G/G
1 TABLET ORAL DAILY
COMMUNITY

## 2024-10-04 RX ORDER — POLYETHYLENE GLYCOL 3350 17 G/17G
17 POWDER, FOR SOLUTION ORAL DAILY PRN
Status: DISCONTINUED | OUTPATIENT
Start: 2024-10-04 | End: 2024-10-05 | Stop reason: HOSPADM

## 2024-10-04 RX ORDER — BISACODYL 10 MG
10 SUPPOSITORY, RECTAL RECTAL DAILY PRN
Status: DISCONTINUED | OUTPATIENT
Start: 2024-10-04 | End: 2024-10-05 | Stop reason: HOSPADM

## 2024-10-04 RX ORDER — PANTOPRAZOLE SODIUM 40 MG/1
40 TABLET, DELAYED RELEASE ORAL DAILY
Status: DISCONTINUED | OUTPATIENT
Start: 2024-10-05 | End: 2024-10-05 | Stop reason: HOSPADM

## 2024-10-04 RX ORDER — ISOSORBIDE MONONITRATE 30 MG/1
60 TABLET, EXTENDED RELEASE ORAL DAILY
Status: DISCONTINUED | OUTPATIENT
Start: 2024-10-05 | End: 2024-10-05 | Stop reason: HOSPADM

## 2024-10-04 RX ORDER — MIRTAZAPINE 15 MG/1
15 TABLET, FILM COATED ORAL NIGHTLY
Status: DISCONTINUED | OUTPATIENT
Start: 2024-10-04 | End: 2024-10-05 | Stop reason: HOSPADM

## 2024-10-04 RX ADMIN — MIRTAZAPINE 15 MG: 15 TABLET, FILM COATED ORAL at 20:02

## 2024-10-04 RX ADMIN — SOTALOL HYDROCHLORIDE 80 MG: 80 TABLET ORAL at 20:02

## 2024-10-04 RX ADMIN — SODIUM CHLORIDE 75 ML/HR: 9 INJECTION, SOLUTION INTRAVENOUS at 19:28

## 2024-10-04 RX ADMIN — AMLODIPINE BESYLATE 5 MG: 5 TABLET ORAL at 19:06

## 2024-10-04 RX ADMIN — Medication 10 ML: at 20:02

## 2024-10-04 RX ADMIN — IOPAMIDOL 75 ML: 755 INJECTION, SOLUTION INTRAVENOUS at 14:35

## 2024-10-04 RX ADMIN — APIXABAN 2.5 MG: 2.5 TABLET, FILM COATED ORAL at 20:02

## 2024-10-04 RX ADMIN — ACETAMINOPHEN 650 MG: 325 TABLET ORAL at 19:06

## 2024-10-04 RX ADMIN — SODIUM CHLORIDE 500 ML: 9 INJECTION, SOLUTION INTRAVENOUS at 14:06

## 2024-10-04 RX ADMIN — AMITRIPTYLINE HYDROCHLORIDE 10 MG: 10 TABLET, FILM COATED ORAL at 20:02

## 2024-10-04 RX ADMIN — ASPIRIN 81 MG 81 MG: 81 TABLET ORAL at 19:06

## 2024-10-04 NOTE — H&P
[facility] Medicine Services  HISTORY AND PHYSICAL    Patient Name: Sol Jung  : 1932  MRN: [id number]  Primary Care Physician: Dorcas Lama APRN  Date of admission: 10/4/2024      Subjective   Subjective     Chief Complaint:  Flown from [facility] for cardiac arrest    HPI:  Sol Jung is a 89 y.o. female with past medical history of essential hypertension, paroxysmal atrial fibrillation on Eliquis, coronary artery disease status post multiple stents, pulmonary hypertension, CKD stage III who presented to the hospital today from [facility] for cardiac arrest    The patient does not remember any of what happened and could not contribute much to HPI.  Her daughter at bedside helped with the history.  The patient went for routine visit to see her urologist and was n started ot feeling unwell with generalized bodyaches while in the waiting room and wanted to leave but decided to wait and eventually went to see her provider.  While sitting in the chair, she felt severely lightheaded as if she is going to faint and then lost consciousness.  Her PA did not feel any pulse and started CPR.  Per her daughter, she received 3-4 cycles of CPR after which she regained full consciousness and was feeling very weak.  EMS where called and  they had to flew her here for higher level of care.    At the time of the encounter, the patient denied any chest pain or shortness of breath.  No cough or fever.  No urinary symptoms.  Denied any palpitation.  Never had similar symptoms before.    In ER, point-of-care ultrasound performed and showed hyperdynamic left ventricle with severely collapsed IVC less than 1 cm.    Workup in ER showed high-sensitivity troponin 45, sodium 129, chloride of 97, creatinine of 1.27, D-dimer 3.13 and normal CBC.  CTA chest with no PE or acute lung pathology.  She will be admitted to the hospital medicine service for further management    Personal  Urology Consult    Patient: Lauren Bowen MRN: [id number]  SSN: [id number]    YOB: 1961  Age: 64 y.o. Sex: female      Subjective:      Lauren Bowen is a 64 y.o. female who we are consulted on for mild right hydronephrosis. She has hx of crohns disease, hypertension, CKD3, who presents to ER with c/o several weeks of nausea, emesis and abdominal pain. She was seen by GI associates last week for possible crohn's flare and sent for CT CAP which per GI note shows diffusely thickened transverse, descending and sigmoid colon c/w IBD, slightly prominent adrenal glands and small renal and hepatic cysts. She reports anorexia, weight loss, dry skin, abd pain, decreased urination and dysuria. She is admitted by hospitalist for JULIETH (cr 3.8, baseline 1.6), diarrhea. GI following and she is being treated with solumedrol, PPI, levsin, lomotil, IV hydration. She was previously followed by nephrology but has not seen in years. UA showed 10-20 WBC, 3-5 RBC, +4 bacteria, no urine culture sent. Has not been treated for UTI per my review. Nephrology consulted and ordered renal US which shows mild R hydronephrosis with visualized left ureteral jet and no visualized right jet. No ureteral stones per report. Pt reports feeling of incomplete emptying of her bladder. She denies any urological hx, denies flank pain. Cr today is 1.8. WBC normal.      Past Medical History:   Diagnosis Date    Depression     managed with medication     Disc prolapse     L4 to L5    DJD (degenerative joint disease)     GERD (gastroesophageal reflux disease)     managed with medication     Hypertension     managed with medication     Obesity (BMI 30-39. 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/2013    Steroid-induced diabetes mellitus ([address] 6/5/2013    no present medication needed     Ulcerative colitis ([address]     managed with medication      Past Surgical History:   Procedure Laterality Date    COLONOSCOPY  2009    showed UC    FLEXIBLE SIGMOIDOSCOPY N/A 5/11/2016    SIGMOIDOSCOPY FLEXIBLE/   BMI 36 performed by Zaida Wagner MD at [place] [id number]    left mastoidectomy    HEENT  1971    tympanoplasty    INNER EAR SURGERY PROC UNLISTED  48,06,04    mastoid cyst +TM repair-hearing loss, left ear    TONSILLECTOMY  1971         VASCULAR SURGERY  06/5/2013 [place]    port placement    VASCULAR SURGERY  2013    port placement for remicade      Family History   Problem Relation Age of Onset    Osteoarthritis Brother     Heart Disease Father     Hypertension Father     Breast Cancer Neg Hx     Diabetes Mother         type I     Social History     Tobacco Use    Smoking status: Every Day     Packs/day: 1.00     Types: Cigarettes    Smokeless tobacco: Never   Substance Use Topics    Alcohol use: No      Prior to Admission medications    Medication Sig Start Date End Date Taking? Authorizing Provider   buPROPion (WELLBUTRIN XL) 150 MG extended release tablet Take 150 mg by mouth every morning   Yes Historical Provider, MD   irbesartan-hydroCHLOROthiazide (AVALIDE) 150-12.5 MG per tablet Take 1 tablet by mouth every other day   Yes Historical Provider, MD   traMADol (ULTRAM) 50 MG tablet Take 50 mg by mouth every 8 hours as needed for Pain. Yes Historical Provider, MD   dexlansoprazole (DEXILANT) 60 MG CPDR delayed release capsule Take 60 mg by mouth   Yes Ar Automatic Reconciliation   pravastatin (PRAVACHOL) 80 MG tablet Take 80 mg by mouth   Yes Ar Automatic Reconciliation   rOPINIRole (REQUIP) 0.5 MG tablet Take 0.5 mg by mouth   Yes Ar Automatic Reconciliation   sertraline (ZOLOFT) 100 MG tablet Take 100 mg by mouth   Yes Ar Automatic Reconciliation   acetaminophen (TYLENOL) 650 MG extended release tablet Take 650 mg by mouth every 6 hours as needed    Ar Automatic Reconciliation   LORazepam (ATIVAN) 1 MG tablet Take 1 mg by mouth.     Ar Automatic Reconciliation   mesalamine (DELZICOL) 400 History     Past Medical History:   Diagnosis Date    Atrial fibrillation     Heart murmur     Hypertension     Myocardial infarction            Past Surgical History:   Procedure Laterality Date    ABLATION OF DYSRHYTHMIC FOCUS      CAROTID STENT         Family History: family history includes No Known Problems in her father and mother.     Social History:  reports that she has never smoked. She has never used smokeless tobacco. She reports that she does not currently use alcohol. She reports that she does not use drugs.  Social History     Social History Narrative    Not on file       Medications:  Available home medication information reviewed.  Bacillus Coagulans-Inulin, Famotidine, Potassium, Probiotic Product, Vitamin B-12, amLODIPine, amitriptyline, aspirin, atorvastatin, cholecalciferol, citalopram, clopidogrel, docusate sodium, iron polysaccharides, isosorbide mononitrate, lisinopril, magnesium chloride ER, mirtazapine, nitroglycerin, ondansetron, pantoprazole, sotalol, and vitamin C    No Known Allergies    Objective   Objective     Vital Signs:   Temp:  [98.9 °F (37.2 °C)] 98.9 °F (37.2 °C)  Heart Rate:  [65-76] 65  Resp:  [16-18] 16  BP: (141-171)/() 168/86  Flow (L/min):  [0-2] 0       Physical Exam   General: Comfortable, not in distress, conversant and cooperative  Head: Atraumatic and normocephalic  Eyes: No Icterus. No pallor  Ears:  Ears appear intact with no abnormalities noted  Throat: No oral lesions, no thrush  Neck: Supple, trachea midline  Lungs: Clear to auscultation bilaterally, equal air entry, no wheezing or crackles  Heart:  Normal S1 and S2, no murmur, no gallop, No JVD, no lower extremity swelling  Abdomen:  Soft, no tenderness, no organomegaly, normal bowel sounds, no organomegaly  Extremities: pulses equal bilaterally  Skin: No bleeding, bruising or rash, normal skin turgor and elasticity  Neurologic: Cranial nerves appear intact with no evidence of facial asymmetry, normal  MG CPDR delayed release capsule Take 400 mg by mouth 2 times daily  Patient not taking: Reported on 8/8/2022    Ar Automatic Reconciliation   predniSONE 10 MG (21) TBPK Take by mouth See Admin Instructions  Patient not taking: Reported on 8/8/2022    Ar Automatic Reconciliation   Probiotic Product (ACIDOPHILUS PROBIOTIC) CAPS capsule Take 1 tablet by mouth    Ar Automatic Reconciliation   valsartan-hydroCHLOROthiazide (DIOVAN-HCT) 160-25 MG per tablet Take 1 tablet by mouth    Ar Automatic Reconciliation        Allergies   Allergen Reactions    Codeine Nausea Only       Review of Systems:  As stated in H&P    Objective:     Vitals:    08/10/22 0357 08/10/22 0732 08/10/22 0745 08/10/22 0802   BP:   104/65    Pulse:   78    Resp: 18 18 18 16   Temp:   97.7 °F (36.5 °C)    TempSrc:   Oral    SpO2:   99%    Weight:       Height:            Physical Exam:  GENERAL ASSESSMENT: alert, oriented to person, place and time, no acute distress   Chest: clear to auscultation  CVS exam: normal rate, regular rhythm, normal S1, S2  ABDOMEN: soft, tender, no flank or SP pain  Neurological exam reveals alert, oriented, normal speech    Assessment:     64 y.o. female with hx of crohns disease, hypertension, CKD3, who presents to ER with c/o several weeks of nausea, emesis and abdominal pain with diarrhea. Admitted to hospitalist with JULIETH (cr of 3.8, baseline 1.6) and diarrhea. She was seen by GI associates last week for possible crohn's flare and sent for CT CAP which per GI note shows diffusely thickened transverse, descending and sigmoid colon c/w IBD, slightly prominent adrenal glands and small renal and hepatic cysts (report/imaging not available). She reports anorexia, weight loss, dry skin,abd pain, decreased urination and dysuria. UA showed 10-20 WBC, 3-5 RBC, +4 bacteria, no urine culture sent. Has not been treated for UTI.   GI following- treating for crohns flare and Nephrology consulted - ordered renal US which shows motor and sensory functions in all 4 extremities  Psych: Alert and oriented x 3, normal mood    Result Review:  I have personally reviewed the results from the time of this admission to 10/4/2024 17:27 EDT and agree with these findings:  [x]  Laboratory list / accordion  []  Microbiology  [x]  Radiology  []  EKG/Telemetry   [x]  Cardiology/Vascular   [x]  Pathology  [x]  Old records      LAB RESULTS:      Lab 10/04/24  1313   WBC 7.73   HEMOGLOBIN 14.1   HEMATOCRIT 39.8   PLATELETS 222   NEUTROS ABS 4.95   IMMATURE GRANS (ABS) 0.06*   LYMPHS ABS 1.99   MONOS ABS 0.43   EOS ABS 0.24   MCV 96.1   D DIMER QUANT 3.13*         Lab 10/04/24  1313   SODIUM 129*   POTASSIUM 4.2   CHLORIDE 97*   CO2 24.0   ANION GAP 8.0   BUN 24*   CREATININE 1.27*   EGFR 39.8*   GLUCOSE 102*   CALCIUM 9.3   MAGNESIUM 1.8         Lab 10/04/24  1313   TOTAL PROTEIN 7.7   ALBUMIN 4.2   GLOBULIN 3.5   ALT (SGPT) 21   AST (SGOT) 27   BILIRUBIN 0.9   ALK PHOS 58         Lab 10/04/24  1313   HSTROP T 45*                     Microbiology Results (last 10 days)       ** No results found for the last 240 hours. **            CT Angiogram Chest    Result Date: 10/4/2024  CT ANGIOGRAM CHEST Date of Exam: 10/4/2024 2:30 PM EDT Indication: found down, had cpr, + d dimer, eval rib fx, pe. Comparison: None available. Technique: CTA of the chest was performed after the uneventful intravenous administration of 75 cc Isovue-370. Reconstructed coronal and sagittal images were also obtained. In addition, a 3-D volume rendered image was created for interpretation. Automated exposure control and iterative reconstruction methods were used. Findings: PULMONARY VASCULATURE: Pulmonary arteries are widely patent without evidence of embolus.  Main pulmonary artery is normal in size. No evidence of right heart strain. MEDIASTINUM: Mild mural thickening with fluid and gas distention of the esophagus. Correlate for signs of reflux induced esophagitis. Aortic size is  mild R hydronephrosis with visualized left ureteral jet and no visualized right jet. No ureteral stones per report. Cr down to 1.8.  still reports difficulty emptying. Plan: Will order CT renal stone protocol to ensure no obstruction or abnormalities. Will order new UA/urine culture today and start rocephin for UTI. Thank you for the opportunity to assist in the care of this patient. Addendum:  CT shows no ureteral stones or hydronephrosis. Follow up UA negative for infection. Rocephin discontinued. We will s/o.           Signed By: Eliana Singh, LAURA - CNP     August 10, 2022 normal. Heart is enlarged. No aortic dissection identified. No mass nor pericardial effusion. CORONARY ARTERIES: There is calcified atherosclerotic disease. LUNGS: Minimal basilar atelectasis. No dense consolidation. No significant nodule nor interstitial changes. There is a 3 mm right upper lobe nodule (image 21, series 6). PLEURAL SPACE: No effusion, mass, nor pneumothorax. LYMPH NODES: There are no pathologically enlarged lymph nodes. UPPER ABDOMEN: Unremarkable OSSEOUS STRUCTURES: Appropriate for age with no acute process identified.     Impression: Impression: 1.No evidence of pulmonary embolism no acute pulmonary process. 2.Imaging features suggestive of reflux and esophagitis. Correlate with symptoms. 3.There is a 3 mm right upper lobe pulmonary nodule doubtful clinical significance. See below recommendations of follow-up desired. The Fleischner society pulmonary nodule recommendations are for the follow-up and management of pulmonary nodules smaller than 8 mm detected incidentally in patients >35 years on non-screening CT. The initial guidelines for the management of solid nodules were released in 2005 1, and guidelines for the management of subsolid nodules were released in 2013 2. New revised 2017 recommendations for both solid and subsolid have since been released 4. 2017 guidelines Solid nodules Solitary nodule size: <6 mm *low risk patients: no follow-up needed *high risk patients: optional CT at 12 months Solitary nodule size: 6-8 mm *low risk patients: follow-up at 6-12 months, then consider further follow-up at 18-24 months *high risk patients: initial follow-up CT at 6-12 months and then at 18-24 months if no change Solitary nodule size: >8 mm *either low or high risk patients *consider follow-up CT at 3 months, and/or CT-PET, and/or biopsy Multiple nodules size: <6 mm *low risk patients: no routine follow-up *high risk patients: optional CT at 12 months Multiple nodules size: 6-8 mm *low risk  "patients: follow-up at 3-6 months, then consider further follow-up at 18-24 months *high risk patients: follow-up at 3-6 months, then at 18-24 months if no change Multiple nodules size: >8 mm *low risk patients: follow-up at 3-6 months, then consider further follow-up at 18-24 months *high risk patients: follow-up at 3-6 months, then at 18-24 months if no change * Note: newly detected indeterminate nodule in persons 35 years of age or older. *low risk patients: minimal or absent history of smoking and or other known risk factors *high risk patients: history of smoking or of other known risk factors (e.g. first degree relative with lung cancer, or exposure to asbestos, radon, uranium) *if a nodule up to 8 mm is partly solid or is ground glass further follow-up is required after 24 months to exclude possible slow growing adenocarcinoma (MISTY) Subsolid nodules Solitary pure ground-glass nodule *nodule size <6mm *no CT follow-up required *nodule size \"e6mm *follow up CT at 6-12 months, then every 2 years until 5 years Solitary part-solid nodule *nodule size <6mm *no CT follow-up required *nodule size \"e6mm *follow-up CT at 3-6 months *if unchanged, and solid component remains <6mm, then annual follow-up for 5 years Multiple subsolid nodules *nodule size <6mm *follow-up CT at 3-6 months *consider further follow-up at 2 and 4 years if stable *nodule size \"e6mm *follow-up CT at 3-6 months *subsequent management based on the most suspicious nodule(s) Electronically Signed: Hira Oneill MD  10/4/2024 2:51 PM EDT  Workstation ID: [id number]    CT Head Without Contrast    Result Date: 10/4/2024  CT HEAD WO CONTRAST Date of Exam: 10/4/2024 2:32 PM EDT Indication: ams, found down. Comparison: None available. Technique: Axial CT images were obtained of the head without contrast administration.  Automated exposure control and iterative construction methods were used. Findings: No acute intracranial hemorrhage. No acute large " territory infarct. There are patchy and confluent areas of subcortical and periventricular white matter hypodensity which is nonspecific and can be seen in the setting of chronic small vessel ischemic change. There is intracranial atherosclerosis. No extra-axial collections. No midline shift or herniation. Normal size and configuration of the ventricles. Normal appearance of the orbits. The paranasal sinuses are clear. The mastoid air cells are clear.  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     Impression: Impression: No acute intracranial findings. Chronic and senescent changes as above. Electronically Signed: Ryder Sierra MD  10/4/2024 2:39 PM EDT  Workstation ID: [id number]    XR Chest 1 View    Result Date: 10/4/2024  XR CHEST 1 VW Date of Exam: 10/4/2024 1:22 PM EDT Indication: Weak/Dizzy/AMS triage protocol Comparison: None available. Findings: Cardiomediastinal silhouette is within normal limits. No focal opacity, pleural effusion or pneumothorax. No evidence of acute osseous abnormalities. Visualized upper abdomen is unremarkable.     Impression: Impression: No radiographic evidence of acute cardiopulmonary process. Electronically Signed: Neil Curtis MD  10/4/2024 1:55 PM EDT  Workstation ID: [id number]         Assessment & Plan   Assessment & Plan       Loss of consciousness    Summary:  Sol Jung is a 89 y.o. female with past medical history of essential hypertension, paroxysmal atrial fibrillation on Eliquis, coronary artery disease status post multiple stents, pulmonary hypertension, CKD stage III who presented to the hospital today from [facility] for cardiac arrest    Syncope versus true cardiac arrest  The patient had prodromal symptoms (not feeling well and as if she is malcolm faint) followed by loss of consciousness which indicate syncope.  Point-of-care ultrasound in ER revealed severe volume depletion (hyperdynamic left ventricle and  severely collapsed IVC)  On the other hand, her urologist PA could not appreciate pulses and according to the daughter, she performed 3-4 cycles of CPR i.e. the patient did not regain her consciousness right away which is concerning for arrhythmias  She is fully awake and alert currently and does not have any chest pain or significantly abnormal lab  Admit and monitor on telemetry  QTc is normal despite being on sotalol, citalopram, amitriptyline and mirtazapine  Official echocardiogram  Gentle IV fluids  Consider Holter monitor upon discharge  Cardiology consultation    Coronary artery disease status post multiple stents  Continue aspirin and Plavix  Continue Imdur    Essential hypertension  Dyslipidemia  Continue amlodipine continue statins    Paroxysmal atrial fibrillation  Currently in sinus rhythm and rate controlled  Continue sotalol  Continue low-dose Eliquis    Weakness  PT and OT      VTE Prophylaxis:  Pharmacologic VTE prophylaxis orders are present.          CODE STATUS:    Code Status and Medical Interventions: CPR (Attempt to Resuscitate); Full Support   Ordered at: 10/04/24 1715     Level Of Support Discussed With:    Patient    Next of Kin (If No Surrogate)     Code Status (Patient has no pulse and is not breathing):    CPR (Attempt to Resuscitate)     Medical Interventions (Patient has pulse or is breathing):    Full Support       Expected Discharge   Expected discharge date/ time has not been documented.     Radhika Das MD  10/04/24

## 2024-10-04 NOTE — ED PROVIDER NOTES
"Subjective   History of Present Illness  This is a delightful 89-year-old female who is a fly and patient from [facility] for cardiac arrest.  History is related by the patient and the flight crew was she was at her urologist for routine follow-up at some point lost consciousness he was found to be pulseless and had a few minutes of CPR before she revived and asked what the people were doing pounding on her chest.  She apparently had no medication either in the clinic or by the EMS flight crew.  She regained her sensorium quickly but because an extensive cardiac history she was flown here for further evaluation.  Unfortunately she has had none of her previous cardiac treatment here.    She relates to me that she was admitted to [facility] 2 years ago in [place] for a \"massive coronary\" and had stents placed.    She has been followed also for paroxysmal A-fib but is not anticoagulated due to bleeding risk.  There is a note in [facility] that I was able to review from a [facility] provider is not local.    Currently she complains of a little bit of chest pressure.  She has some recollection of this event where she was feeling woozy and lightheaded then went down.  Sounds like she may have had syncope in the past but besides A-fib no history of cardiac arrhythmia or stroke that she knows of.  No recent change in her medications but she does complain of eye discomfort has been chronic due to injections in her eyes.  She has macular degeneration.    She reports bowel movements and urine to been okay.  Baseline she lives with her family gets around using a walker room to room in the house with only mild to modest difficulty.    All other systems are reviewed and are negative except as noted above and I have reviewed the [facility] medical record as well.  She is not a smoker or drinker.        Review of Systems   All other systems reviewed and are negative.      Past Medical History:   Diagnosis Date    Atrial fibrillation  "    Heart murmur     Hypertension     Myocardial infarction        No Known Allergies    Past Surgical History:   Procedure Laterality Date    ABLATION OF DYSRHYTHMIC FOCUS      CAROTID STENT         Family History   Problem Relation Age of Onset    No Known Problems Mother     No Known Problems Father        Social History     Socioeconomic History    Marital status:    Tobacco Use    Smoking status: Never    Smokeless tobacco: Never   Vaping Use    Vaping status: Never Used   Substance and Sexual Activity    Alcohol use: Not Currently    Drug use: Never    Sexual activity: Defer           Objective   Physical Exam  Vitals and nursing note reviewed.   Constitutional:       Comments: This is a 89-year-old female who is alert oriented and gives a pretty decent history.  She is in no distress.  GCS is 15.   HENT:      Head: Normocephalic and atraumatic.      Nose: Nose normal.      Mouth/Throat:      Mouth: Mucous membranes are moist.      Pharynx: Oropharynx is clear.   Eyes:      Extraocular Movements: Extraocular movements intact.      Conjunctiva/sclera: Conjunctivae normal.      Pupils: Pupils are equal, round, and reactive to light.   Neck:      Vascular: No carotid bruit.   Cardiovascular:      Rate and Rhythm: Normal rate and regular rhythm.      Pulses: Normal pulses.      Heart sounds: Normal heart sounds.      Comments: Anterior chest is little tender to palpation but no crepitance.  Pulmonary:      Effort: Pulmonary effort is normal.      Breath sounds: Normal breath sounds.   Abdominal:      Comments: BMI is a bit elevated I would say probably 30 to soft and nontender no organomegaly masses or guarding.  Flanks without CVA tenderness mass or rash.   Musculoskeletal:         General: Normal range of motion.      Cervical back: Normal range of motion and neck supple. No rigidity or tenderness.      Comments: For full pulses in all 4 extremities without edema, synovitis, rash, or venous cords.    Lymphadenopathy:      Cervical: No cervical adenopathy.   Skin:     General: Skin is warm and dry.      Capillary Refill: Capillary refill takes less than 2 seconds.   Neurological:      Mental Status: She is alert.      Comments: Face symmetric voice strong tongue midline.  Vision, hearing, and speech preserved her vision is a little decreased bilaterally which is chronic.  Mild generalized weakness without focality.  Knee jerks are 1+ bilaterally.         Procedures           ED Course                                        Recent Results (from the past 24 hour(s))   ECG 12 Lead ED Triage Standing Order; Weak / Dizzy / AMS    Collection Time: 10/04/24  1:01 PM   Result Value Ref Range    QT Interval 414 ms    QTC Interval 465 ms   Comprehensive Metabolic Panel    Collection Time: 10/04/24  1:13 PM    Specimen: Blood   Result Value Ref Range    Glucose 102 (H) 65 - 99 mg/dL    BUN 24 (H) 8 - 23 mg/dL    Creatinine 1.27 (H) 0.57 - 1.00 mg/dL    Sodium 129 (L) 136 - 145 mmol/L    Potassium 4.2 3.5 - 5.2 mmol/L    Chloride 97 (L) 98 - 107 mmol/L    CO2 24.0 22.0 - 29.0 mmol/L    Calcium 9.3 8.2 - 9.6 mg/dL    Total Protein 7.7 6.0 - 8.5 g/dL    Albumin 4.2 3.5 - 5.2 g/dL    ALT (SGPT) 21 1 - 33 U/L    AST (SGOT) 27 1 - 32 U/L    Alkaline Phosphatase 58 39 - 117 U/L    Total Bilirubin 0.9 0.0 - 1.2 mg/dL    Globulin 3.5 gm/dL    A/G Ratio 1.2 g/dL    BUN/Creatinine Ratio 18.9 7.0 - 25.0    Anion Gap 8.0 5.0 - 15.0 mmol/L    eGFR 39.8 (L) >60.0 mL/min/1.73   Single High Sensitivity Troponin T    Collection Time: 10/04/24  1:13 PM    Specimen: Blood   Result Value Ref Range    HS Troponin T 45 (H) <14 ng/L   Magnesium    Collection Time: 10/04/24  1:13 PM    Specimen: Blood   Result Value Ref Range    Magnesium 1.8 1.7 - 2.3 mg/dL   Green Top (Gel)    Collection Time: 10/04/24  1:13 PM   Result Value Ref Range    Extra Tube Hold for add-ons.    Lavender Top    Collection Time: 10/04/24  1:13 PM   Result Value Ref Range     Extra Tube hold for add-on    Gold Top - SST    Collection Time: 10/04/24  1:13 PM   Result Value Ref Range    Extra Tube Hold for add-ons.    Gray Top    Collection Time: 10/04/24  1:13 PM   Result Value Ref Range    Extra Tube Hold for add-ons.    Light Blue Top    Collection Time: 10/04/24  1:13 PM   Result Value Ref Range    Extra Tube Hold for add-ons.    CBC Auto Differential    Collection Time: 10/04/24  1:13 PM    Specimen: Blood   Result Value Ref Range    WBC 7.73 3.40 - 10.80 10*3/mm3    RBC 4.14 3.77 - 5.28 10*6/mm3    Hemoglobin 14.1 12.0 - 15.9 g/dL    Hematocrit 39.8 34.0 - 46.6 %    MCV 96.1 79.0 - 97.0 fL    MCH 34.1 (H) 26.6 - 33.0 pg    MCHC 35.4 31.5 - 35.7 g/dL    RDW 12.1 (L) 12.3 - 15.4 %    RDW-SD 42.6 37.0 - 54.0 fl    MPV 9.1 6.0 - 12.0 fL    Platelets 222 140 - 450 10*3/mm3    Neutrophil % 64.0 42.7 - 76.0 %    Lymphocyte % 25.7 19.6 - 45.3 %    Monocyte % 5.6 5.0 - 12.0 %    Eosinophil % 3.1 0.3 - 6.2 %    Basophil % 0.8 0.0 - 1.5 %    Immature Grans % 0.8 (H) 0.0 - 0.5 %    Neutrophils, Absolute 4.95 1.70 - 7.00 10*3/mm3    Lymphocytes, Absolute 1.99 0.70 - 3.10 10*3/mm3    Monocytes, Absolute 0.43 0.10 - 0.90 10*3/mm3    Eosinophils, Absolute 0.24 0.00 - 0.40 10*3/mm3    Basophils, Absolute 0.06 0.00 - 0.20 10*3/mm3    Immature Grans, Absolute 0.06 (H) 0.00 - 0.05 10*3/mm3    nRBC 0.0 0.0 - 0.2 /100 WBC   D-dimer, Quantitative    Collection Time: 10/04/24  1:13 PM    Specimen: Blood   Result Value Ref Range    D-Dimer, Quantitative 3.13 (H) 0.00 - 0.92 MCGFEU/mL   ECG 12 Lead Other; Elevated Troponin    Collection Time: 10/04/24  5:27 PM   Result Value Ref Range    QT Interval 462 ms    QTC Interval 480 ms     Note: In addition to lab results from this visit, the labs listed above may include labs taken at another facility or during a different encounter within the last 24 hours. Please correlate lab times with ED admission and discharge times for further clarification of the  services performed during this visit.    CT Angiogram Chest   Final Result   Impression:   1.No evidence of pulmonary embolism no acute pulmonary process.   2.Imaging features suggestive of reflux and esophagitis. Correlate with symptoms.   3.There is a 3 mm right upper lobe pulmonary nodule doubtful clinical significance. See below recommendations of follow-up desired.      The Fleischner society pulmonary nodule recommendations are for the follow-up and management of pulmonary nodules smaller than 8 mm detected incidentally in patients >35 years on non-screening CT. The initial guidelines for the management of solid    nodules were released in 2005 1, and guidelines for the management of subsolid nodules were released in 2013 2. New revised 2017 recommendations for both solid and subsolid have since been released 4.      2017 guidelines   Solid nodules   Solitary nodule size: <6 mm   *low risk patients: no follow-up needed   *high risk patients: optional CT at 12 months   Solitary nodule size: 6-8 mm   *low risk patients: follow-up at 6-12 months, then consider further follow-up at 18-24 months   *high risk patients: initial follow-up CT at 6-12 months and then at 18-24 months if no change   Solitary nodule size: >8 mm   *either low or high risk patients   *consider follow-up CT at 3 months, and/or CT-PET, and/or biopsy   Multiple nodules size: <6 mm   *low risk patients: no routine follow-up   *high risk patients: optional CT at 12 months   Multiple nodules size: 6-8 mm   *low risk patients: follow-up at 3-6 months, then consider further follow-up at 18-24 months   *high risk patients: follow-up at 3-6 months, then at 18-24 months if no change   Multiple nodules size: >8 mm   *low risk patients: follow-up at 3-6 months, then consider further follow-up at 18-24 months   *high risk patients: follow-up at 3-6 months, then at 18-24 months if no change   *    Note: newly detected indeterminate nodule in persons 35  "years of age or older.   *low risk patients: minimal or absent history of smoking and or other known risk factors   *high risk patients: history of smoking or of other known risk factors (e.g. first degree relative with lung cancer, or exposure to asbestos, radon, uranium)   *if a nodule up to 8 mm is partly solid or is ground glass further follow-up is required after 24 months to exclude possible slow growing adenocarcinoma (MISTY)      Subsolid nodules   Solitary pure ground-glass nodule   *nodule size <6mm   *no CT follow-up required   *nodule size \"e6mm   *follow up CT at 6-12 months, then every 2 years until 5 years   Solitary part-solid nodule   *nodule size <6mm   *no CT follow-up required   *nodule size \"e6mm   *follow-up CT at 3-6 months   *if unchanged, and solid component remains <6mm, then annual follow-up for 5 years   Multiple subsolid nodules   *nodule size <6mm   *follow-up CT at 3-6 months   *consider further follow-up at 2 and 4 years if stable   *nodule size \"e6mm   *follow-up CT at 3-6 months   *subsequent management based on the most suspicious nodule(s)            Electronically Signed: Hira Oneill MD     10/4/2024 2:51 PM EDT     Workstation ID: [id number]      CT Head Without Contrast   Final Result   Impression:   No acute intracranial findings.      Chronic and senescent changes as above.            Electronically Signed: Ryder Sierra MD     10/4/2024 2:39 PM EDT     Workstation ID: [id number]      XR Chest 1 View   Final Result   Impression:   No radiographic evidence of acute cardiopulmonary process.         Electronically Signed: Neil Curtis MD     10/4/2024 1:55 PM EDT     Workstation ID: [id number]        Vitals:    10/04/24 1531 10/04/24 1600 10/04/24 1630 10/04/24 1700   BP:  156/83 168/86 137/95   BP Location:       Patient Position:       Pulse: 71 70 65 66   Resp: 16      Temp:       TempSrc:       SpO2: 96% 98% 96% 96%     Medications   sodium chloride 0.9 % flush 10 mL (has " no administration in time range)   sodium chloride 0.9 % infusion (has no administration in time range)   amitriptyline (ELAVIL) tablet 10 mg (has no administration in time range)   amLODIPine (NORVASC) tablet 5 mg (has no administration in time range)   aspirin chewable tablet 81 mg (has no administration in time range)   atorvastatin (LIPITOR) tablet 80 mg (has no administration in time range)   citalopram (CeleXA) tablet 10 mg (has no administration in time range)   clopidogrel (PLAVIX) tablet 75 mg (has no administration in time range)   vitamin B-12 (CYANOCOBALAMIN) tablet 50 mcg (has no administration in time range)   isosorbide mononitrate (IMDUR) 24 hr tablet 60 mg (has no administration in time range)   lisinopril (PRINIVIL,ZESTRIL) tablet 20 mg (has no administration in time range)   mirtazapine (REMERON) tablet 15 mg (has no administration in time range)   pantoprazole (PROTONIX) EC tablet 20 mg (has no administration in time range)   sotalol (BETAPACE) tablet 80 mg (has no administration in time range)   nitroglycerin (NITROSTAT) SL tablet 0.4 mg (has no administration in time range)   sodium chloride 0.9 % flush 10 mL (has no administration in time range)   sodium chloride 0.9 % flush 10 mL (has no administration in time range)   sodium chloride 0.9 % infusion 40 mL (has no administration in time range)   acetaminophen (TYLENOL) tablet 650 mg (has no administration in time range)     Or   acetaminophen (TYLENOL) 160 MG/5ML oral solution 650 mg (has no administration in time range)     Or   acetaminophen (TYLENOL) suppository 650 mg (has no administration in time range)   calcium carbonate (TUMS) chewable tablet 500 mg (200 mg elemental) (has no administration in time range)   LORazepam (ATIVAN) tablet 0.5 mg (has no administration in time range)   sennosides-docusate (PERICOLACE) 8.6-50 MG per tablet 2 tablet (has no administration in time range)     And   polyethylene glycol (MIRALAX) packet 17 g (has  no administration in time range)     And   bisacodyl (DULCOLAX) EC tablet 5 mg (has no administration in time range)     And   bisacodyl (DULCOLAX) suppository 10 mg (has no administration in time range)   Potassium Replacement - Follow Nurse / BPA Driven Protocol (has no administration in time range)   Magnesium Standard Dose Replacement - Follow Nurse / BPA Driven Protocol (has no administration in time range)   Phosphorus Replacement - Follow Nurse / BPA Driven Protocol (has no administration in time range)   Calcium Replacement - Follow Nurse / BPA Driven Protocol (has no administration in time range)   melatonin tablet 5 mg (has no administration in time range)   ondansetron ODT (ZOFRAN-ODT) disintegrating tablet 4 mg (has no administration in time range)     Or   ondansetron (ZOFRAN) injection 4 mg (has no administration in time range)   apixaban (ELIQUIS) tablet 2.5 mg (has no administration in time range)   sodium chloride 0.9 % bolus 500 mL (0 mL Intravenous Stopped 10/4/24 1609)   iopamidol (ISOVUE-370) 76 % injection 75 mL (75 mL Intravenous Given 10/4/24 1435)     ECG/EMG Results (last 24 hours)       Procedure Component Value Units Date/Time    ECG 12 Lead ED Triage Standing Order; Weak / Dizzy / AMS [[id number]] Collected: 10/04/24 1301     Updated: 10/04/24 1401     QT Interval 414 ms      QTC Interval 465 ms     Narrative:      Test Reason : POST CODE  Blood Pressure :   */*   mmHG  Vent. Rate :  76 BPM     Atrial Rate :  76 BPM     P-R Int : 322 ms          QRS Dur :  80 ms      QT Int : 414 ms       P-R-T Axes :  74 -24 109 degrees     QTc Int : 465 ms    Sinus rhythm with 1st degree AV block  Left ventricular hypertrophy with repolarization abnormality  Cannot rule out Septal infarct , age undetermined  Abnormal ECG  No previous ECGs available  qtc slightly prolonged  Confirmed by ONELIA HOLGUIN, PRIMO ([id number]) on 10/4/2024 2:01:19 PM    Referred By: ONELIA           Confirmed By: PRIMO ROUSSEAU MD           ECG 12 Lead Other; Elevated Troponin   Final Result   Test Reason : Other~   Blood Pressure :   */*   mmHG   Vent. Rate :  65 BPM     Atrial Rate :  65 BPM      P-R Int : 300 ms          QRS Dur :  74 ms       QT Int : 462 ms       P-R-T Axes :  64 -23 112 degrees      QTc Int : 480 ms      Sinus rhythm with 1st degree AV block   Nonspecific T wave abnormality   Abnormal ECG   When compared with ECG of 04-OCT-2024 13:01,   Minimal criteria for Septal infarct are no longer present   Nonspecific T wave abnormality now evident in Anterior leads   Confirmed by PRIMO ROUSSEAU MD ([id number]) on 10/4/2024 5:29:51 PM      Referred By: ONELIA           Confirmed By: PRIMO ROUSSEAU MD      ECG 12 Lead ED Triage Standing Order; Weak / Dizzy / AMS   Final Result   Test Reason : POST CODE   Blood Pressure :   */*   mmHG   Vent. Rate :  76 BPM     Atrial Rate :  76 BPM      P-R Int : 322 ms          QRS Dur :  80 ms       QT Int : 414 ms       P-R-T Axes :  74 -24 109 degrees      QTc Int : 465 ms      Sinus rhythm with 1st degree AV block   Left ventricular hypertrophy with repolarization abnormality   Cannot rule out Septal infarct , age undetermined   Abnormal ECG   No previous ECGs available   qtc slightly prolonged   Confirmed by PRIMO ROUSSEAU MD ([id number]) on 10/4/2024 2:01:19 PM      Referred By: ONELIA           Confirmed By: PRIMO ROUSSEAU MD               Medical Decision Making        I have reviewed all available studies at bedside with the patient and her family who is now at the bedside.    Thankfully her head CT is bland and her CTA of the chest shows no evidence of pulmonary embolus or rib fractures due to her CPR.  She does have pulmonary nodule but a lifelong non-smoker and likely will not need a follow-up.    I suspect the patient had a syncopal episode today but she may have had bradycardia the point where she lost a pulse also may have had tachycardia.  She has extensive cardiac history and unfortunately none  of her evaluations have been here.    I think it prudent that she be admitted to the hospital for serial exams ongoing telemetry and she may need an event monitor and follow-up with cardiology.    I have contacted Dr. Dunham she and her colleagues admit the patient.    All are agreeable with the plan    Problems Addressed:  History of coronary artery disease: chronic illness or injury with exacerbation, progression, or side effects of treatment that poses a threat to life or bodily functions  Loss of consciousness: complicated acute illness or injury with systemic symptoms that poses a threat to life or bodily functions  Paroxysmal A-fib: chronic illness or injury with exacerbation, progression, or side effects of treatment that poses a threat to life or bodily functions  Pulmonary nodule: undiagnosed new problem with uncertain prognosis  Renal insufficiency: undiagnosed new problem with uncertain prognosis    Amount and/or Complexity of Data Reviewed  External Data Reviewed: notes.  Labs: ordered. Decision-making details documented in ED Course.  Radiology: ordered and independent interpretation performed. Decision-making details documented in ED Course.  ECG/medicine tests: ordered.    Risk  Prescription drug management.  Decision regarding hospitalization.        Final diagnoses:   Loss of consciousness   History of coronary artery disease   Paroxysmal A-fib   Renal insufficiency   Pulmonary nodule       ED Disposition  ED Disposition       ED Disposition   Decision to Admit    Condition   --    Comment   Level of Care: Telemetry [5]   Diagnosis: Loss of consciousness [300778]                 No follow-up provider specified.       Medication List      No changes were made to your prescriptions during this visit.            Ke Jacobs MD  10/04/24 6134

## 2024-10-04 NOTE — ED NOTES
Sol Jugn    Nursing Report ED to Floor:  Mental status: A&OX4  Ambulatory status: NONAMB IN ED  Oxygen Therapy:  RA  Cardiac Rhythm: NSR  Admitted from: HOME  Safety Concerns:  FALL RISK  Social Issues: [facility]  ED Room #:  20    ED Nurse Phone Extension - [phone] or may call [phone].      HPI:   Chief Complaint   Patient presents with    Altered Mental Status       Past Medical History:  Past Medical History:   Diagnosis Date    Atrial fibrillation     Heart murmur     Hypertension     Myocardial infarction         Past Surgical History:  Past Surgical History:   Procedure Laterality Date    ABLATION OF DYSRHYTHMIC FOCUS      CAROTID STENT          Admitting Doctor:   Radhika Das MD    Consulting Provider(s):  Consults       No orders found from 9/5/2024 to 10/5/2024.             Admitting Diagnosis:   The primary encounter diagnosis was Loss of consciousness. Diagnoses of History of coronary artery disease, Paroxysmal A-fib, Renal insufficiency, and Pulmonary nodule were also pertinent to this visit.    Most Recent Vitals:   Vitals:    10/04/24 1530 10/04/24 1531 10/04/24 1600 10/04/24 1630   BP: 171/88  156/83 168/86   BP Location:       Patient Position:       Pulse: 71 71 70 65   Resp:  16     Temp:       TempSrc:       SpO2: 97% 96% 98% 96%       Active LDAs/IV Access:   Lines, Drains & Airways       Active LDAs       Name Placement date Placement time Site Days    Peripheral IV 10/04/24 1310 Anterior;Right Forearm 10/04/24  1310  Forearm  less than 1    Peripheral IV 10/04/24 1311 Anterior;Distal;Right Forearm 10/04/24  1311  Forearm  less than 1                    Labs (abnormal labs have a star):   Labs Reviewed   COMPREHENSIVE METABOLIC PANEL - Abnormal; Notable for the following components:       Result Value    Glucose 102 (*)     BUN 24 (*)     Creatinine 1.27 (*)     Sodium 129 (*)     Chloride 97 (*)     eGFR 39.8 (*)     All other components within normal limits    Narrative:     GFR  "Normal >60  Chronic Kidney Disease <60  Kidney Failure <15    The GFR formula is only valid for adults with stable renal function between ages 18 and 70.   SINGLE HS TROPONIN T - Abnormal; Notable for the following components:    HS Troponin T 45 (*)     All other components within normal limits    Narrative:     High Sensitive Troponin T Reference Range:  <14.0 ng/L- Negative Female for AMI  <22.0 ng/L- Negative Male for AMI  >=14 - Abnormal Female indicating possible myocardial injury.  >=22 - Abnormal Male indicating possible myocardial injury.   Clinicians would have to utilize clinical acumen, EKG, Troponin, and serial changes to determine if it is an Acute Myocardial Infarction or myocardial injury due to an underlying chronic condition.        CBC WITH AUTO DIFFERENTIAL - Abnormal; Notable for the following components:    MCH 34.1 (*)     RDW 12.1 (*)     Immature Grans % 0.8 (*)     Immature Grans, Absolute 0.06 (*)     All other components within normal limits   D-DIMER, QUANTITATIVE - Abnormal; Notable for the following components:    D-Dimer, Quantitative 3.13 (*)     All other components within normal limits    Narrative:     According to the assay 's published package insert, a normal (<0.50 MCGFEU/mL) D-dimer result in conjunction with a non-high clinical probability assessment, excludes deep vein thrombosis (DVT) and pulmonary embolism (PE) with high sensitivity.    D-dimer values increase with age and this can make VTE exclusion of an older population difficult. To address this, the American College [employer], based on best available evidence and recent guidelines, recommends that clinicians use age-adjusted D-dimer thresholds in patients greater than 50 years of age with: a) a low probability of PE who do not meet all Pulmonary Embolism Rule Out Criteria, or b) in those with intermediate probability of PE.   The formula for an age-adjusted D-dimer cut-off is \"age/100\".  For example, " a 60 year old patient would have an age-adjusted cut-off of 0.60 MCGFEU/mL and an 80 year old 0.80 MCGFEU/mL.   MAGNESIUM - Normal   RAINBOW DRAW    Narrative:     The following orders were created for panel order [place] Draw.  Procedure                               Abnormality         Status                     ---------                               -----------         ------                     Green Top (Gel)[[id number]]                                  Final result               Lavender Top[[id number]]                                     Final result               Gold Top - SST[[id number]]                                   Final result               Gray Top[[id number]]                                         Final result               Light Blue Top[[id number]]                                   Final result                 Please view results for these tests on the individual orders.   URINALYSIS W/ MICROSCOPIC IF INDICATED (NO CULTURE)   SINGLE HS TROPONIN T   CBC AND DIFFERENTIAL    Narrative:     The following orders were created for panel order CBC & Differential.  Procedure                               Abnormality         Status                     ---------                               -----------         ------                     CBC Auto Differential[[id number]]        Abnormal            Final result                 Please view results for these tests on the individual orders.   GREEN TOP   LAVENDER TOP   GOLD TOP - SST   GRAY TOP   LIGHT BLUE TOP       Meds Given in ED:   Medications   sodium chloride 0.9 % flush 10 mL (has no administration in time range)   sodium chloride 0.9 % infusion (has no administration in time range)   amitriptyline (ELAVIL) tablet 10 mg (has no administration in time range)   amLODIPine (NORVASC) tablet 5 mg (has no administration in time range)   aspirin chewable tablet 81 mg (has no administration in time range)   atorvastatin (LIPITOR) tablet 80 mg (has no  administration in time range)   citalopram (CeleXA) tablet 10 mg (has no administration in time range)   clopidogrel (PLAVIX) tablet 75 mg (has no administration in time range)   vitamin B-12 (CYANOCOBALAMIN) tablet 50 mcg (has no administration in time range)   isosorbide mononitrate (IMDUR) 24 hr tablet 60 mg (has no administration in time range)   lisinopril (PRINIVIL,ZESTRIL) tablet 20 mg (has no administration in time range)   mirtazapine (REMERON) tablet 15 mg (has no administration in time range)   pantoprazole (PROTONIX) EC tablet 20 mg (has no administration in time range)   sotalol (BETAPACE) tablet 80 mg (has no administration in time range)   sodium chloride 0.9 % bolus 500 mL (0 mL Intravenous Stopped 10/4/24 1609)   iopamidol (ISOVUE-370) 76 % injection 75 mL (75 mL Intravenous Given 10/4/24 1435)     sodium chloride, 75 mL/hr         Last NIH score:                                                          Dysphagia screening results:        [place] Coma Scale:  No data recorded     CIWA:        Restraint Type:            Isolation Status:  No active isolations

## 2024-10-05 ENCOUNTER — APPOINTMENT (OUTPATIENT)
Dept: CARDIOLOGY | Facility: HOSPITAL | Age: 89
End: 2024-10-05
Payer: MEDICARE

## 2024-10-05 VITALS
TEMPERATURE: 98 F | BODY MASS INDEX: 30.45 KG/M2 | HEART RATE: 72 BPM | WEIGHT: 194 LBS | RESPIRATION RATE: 16 BRPM | HEIGHT: 67 IN | OXYGEN SATURATION: 96 % | SYSTOLIC BLOOD PRESSURE: 127 MMHG | DIASTOLIC BLOOD PRESSURE: 75 MMHG

## 2024-10-05 LAB
ALBUMIN SERPL-MCNC: 3.6 G/DL (ref 3.5–5.2)
ALBUMIN/GLOB SERPL: 1.3 G/DL
ALP SERPL-CCNC: 45 U/L (ref 39–117)
ALT SERPL W P-5'-P-CCNC: 15 U/L (ref 1–33)
ANION GAP SERPL CALCULATED.3IONS-SCNC: 4 MMOL/L (ref 5–15)
AST SERPL-CCNC: 20 U/L (ref 1–32)
BASOPHILS # BLD AUTO: 0.06 10*3/MM3 (ref 0–0.2)
BASOPHILS NFR BLD AUTO: 1.1 % (ref 0–1.5)
BH CV ECHO MEAS - AI P1/2T: 369.6 MSEC
BH CV ECHO MEAS - AO MAX PG: 12.5 MMHG
BH CV ECHO MEAS - AO MEAN PG: 7 MMHG
BH CV ECHO MEAS - AO ROOT DIAM: 3.4 CM
BH CV ECHO MEAS - AO V2 MAX: 177 CM/SEC
BH CV ECHO MEAS - AO V2 VTI: 36.5 CM
BH CV ECHO MEAS - AVA(I,D): 1.7 CM2
BH CV ECHO MEAS - EDV(CUBED): 64 ML
BH CV ECHO MEAS - EDV(MOD-SP2): 66.7 ML
BH CV ECHO MEAS - EDV(MOD-SP4): 60.6 ML
BH CV ECHO MEAS - EF(MOD-BP): 65.6 %
BH CV ECHO MEAS - EF(MOD-SP2): 63.1 %
BH CV ECHO MEAS - EF(MOD-SP4): 68.6 %
BH CV ECHO MEAS - ESV(CUBED): 17.6 ML
BH CV ECHO MEAS - ESV(MOD-SP2): 24.6 ML
BH CV ECHO MEAS - ESV(MOD-SP4): 19 ML
BH CV ECHO MEAS - FS: 35 %
BH CV ECHO MEAS - IVS/LVPW: 1.67 CM
BH CV ECHO MEAS - IVSD: 1.5 CM
BH CV ECHO MEAS - LA DIMENSION: 4.2 CM
BH CV ECHO MEAS - LAT PEAK E' VEL: 7.7 CM/SEC
BH CV ECHO MEAS - LV MASS(C)D: 165.5 GRAMS
BH CV ECHO MEAS - LV MAX PG: 2.6 MMHG
BH CV ECHO MEAS - LV MEAN PG: 1 MMHG
BH CV ECHO MEAS - LV V1 MAX: 81.2 CM/SEC
BH CV ECHO MEAS - LV V1 VTI: 17.9 CM
BH CV ECHO MEAS - LVIDD: 4 CM
BH CV ECHO MEAS - LVIDS: 2.6 CM
BH CV ECHO MEAS - LVOT AREA: 3.5 CM2
BH CV ECHO MEAS - LVOT DIAM: 2.1 CM
BH CV ECHO MEAS - LVPWD: 0.9 CM
BH CV ECHO MEAS - MED PEAK E' VEL: 4.9 CM/SEC
BH CV ECHO MEAS - MV A MAX VEL: 94.9 CM/SEC
BH CV ECHO MEAS - MV DEC SLOPE: 442 CM/SEC2
BH CV ECHO MEAS - MV DEC TIME: 0.25 SEC
BH CV ECHO MEAS - MV E MAX VEL: 80.7 CM/SEC
BH CV ECHO MEAS - MV E/A: 0.85
BH CV ECHO MEAS - MV P1/2T: 63.3 MSEC
BH CV ECHO MEAS - MVA(P1/2T): 3.5 CM2
BH CV ECHO MEAS - PA ACC TIME: 0.1 SEC
BH CV ECHO MEAS - PA V2 MAX: 123 CM/SEC
BH CV ECHO MEAS - PAPD(PI EDV): 8 MMHG
BH CV ECHO MEAS - PI END-D VEL: 140 CM/SEC
BH CV ECHO MEAS - RAP SYSTOLE: 3 MMHG
BH CV ECHO MEAS - RVSP: 31 MMHG
BH CV ECHO MEAS - SV(LVOT): 62 ML
BH CV ECHO MEAS - SV(MOD-SP2): 42.1 ML
BH CV ECHO MEAS - SV(MOD-SP4): 41.6 ML
BH CV ECHO MEAS - TAPSE (>1.6): 2.08 CM
BH CV ECHO MEAS - TR MAX PG: 27.5 MMHG
BH CV ECHO MEAS - TR MAX VEL: 262 CM/SEC
BH CV ECHO MEASUREMENTS AVERAGE E/E' RATIO: 12.81
BH CV VAS BP LEFT ARM: NORMAL MMHG
BH CV XLRA - RV BASE: 4.2 CM
BH CV XLRA - RV LENGTH: 6.3 CM
BH CV XLRA - RV MID: 3.3 CM
BH CV XLRA - TDI S': 11.4 CM/SEC
BILIRUB SERPL-MCNC: 0.6 MG/DL (ref 0–1.2)
BUN SERPL-MCNC: 18 MG/DL (ref 8–23)
BUN/CREAT SERPL: 15.9 (ref 7–25)
CALCIUM SPEC-SCNC: 8.5 MG/DL (ref 8.2–9.6)
CHLORIDE SERPL-SCNC: 104 MMOL/L (ref 98–107)
CO2 SERPL-SCNC: 24 MMOL/L (ref 22–29)
CREAT SERPL-MCNC: 1.13 MG/DL (ref 0.57–1)
DEPRECATED RDW RBC AUTO: 44.7 FL (ref 37–54)
EGFRCR SERPLBLD CKD-EPI 2021: 45.7 ML/MIN/1.73
EOSINOPHIL # BLD AUTO: 0.32 10*3/MM3 (ref 0–0.4)
EOSINOPHIL NFR BLD AUTO: 5.7 % (ref 0.3–6.2)
ERYTHROCYTE [DISTWIDTH] IN BLOOD BY AUTOMATED COUNT: 12.2 % (ref 12.3–15.4)
GLOBULIN UR ELPH-MCNC: 2.8 GM/DL
GLUCOSE BLDC GLUCOMTR-MCNC: 113 MG/DL (ref 70–130)
GLUCOSE SERPL-MCNC: 102 MG/DL (ref 65–99)
HCT VFR BLD AUTO: 35.2 % (ref 34–46.6)
HGB BLD-MCNC: 12.1 G/DL (ref 12–15.9)
IMM GRANULOCYTES # BLD AUTO: 0.02 10*3/MM3 (ref 0–0.05)
IMM GRANULOCYTES NFR BLD AUTO: 0.4 % (ref 0–0.5)
IVRT: 93 MS
LEFT ATRIUM VOLUME INDEX: 40.6 ML/M2
LV EF 2D ECHO EST: 66 %
LYMPHOCYTES # BLD AUTO: 1.86 10*3/MM3 (ref 0.7–3.1)
LYMPHOCYTES NFR BLD AUTO: 33.4 % (ref 19.6–45.3)
MAGNESIUM SERPL-MCNC: 1.6 MG/DL (ref 1.7–2.3)
MCH RBC QN AUTO: 34.1 PG (ref 26.6–33)
MCHC RBC AUTO-ENTMCNC: 34.4 G/DL (ref 31.5–35.7)
MCV RBC AUTO: 99.2 FL (ref 79–97)
MONOCYTES # BLD AUTO: 0.51 10*3/MM3 (ref 0.1–0.9)
MONOCYTES NFR BLD AUTO: 9.2 % (ref 5–12)
NEUTROPHILS NFR BLD AUTO: 2.8 10*3/MM3 (ref 1.7–7)
NEUTROPHILS NFR BLD AUTO: 50.2 % (ref 42.7–76)
NRBC BLD AUTO-RTO: 0 /100 WBC (ref 0–0.2)
PHOSPHATE SERPL-MCNC: 3.3 MG/DL (ref 2.5–4.5)
PLATELET # BLD AUTO: 187 10*3/MM3 (ref 140–450)
PMV BLD AUTO: 9.2 FL (ref 6–12)
POTASSIUM SERPL-SCNC: 3.7 MMOL/L (ref 3.5–5.2)
PROT SERPL-MCNC: 6.4 G/DL (ref 6–8.5)
QT INTERVAL: 448 MS
QT INTERVAL: 448 MS
QTC INTERVAL: 465 MS
QTC INTERVAL: 476 MS
RBC # BLD AUTO: 3.55 10*6/MM3 (ref 3.77–5.28)
SODIUM SERPL-SCNC: 132 MMOL/L (ref 136–145)
WBC NRBC COR # BLD AUTO: 5.57 10*3/MM3 (ref 3.4–10.8)

## 2024-10-05 PROCEDURE — 80053 COMPREHEN METABOLIC PANEL: CPT | Performed by: INTERNAL MEDICINE

## 2024-10-05 PROCEDURE — G0378 HOSPITAL OBSERVATION PER HR: HCPCS

## 2024-10-05 PROCEDURE — 83735 ASSAY OF MAGNESIUM: CPT | Performed by: INTERNAL MEDICINE

## 2024-10-05 PROCEDURE — 93010 ELECTROCARDIOGRAM REPORT: CPT | Performed by: STUDENT IN AN ORGANIZED HEALTH CARE EDUCATION/TRAINING PROGRAM

## 2024-10-05 PROCEDURE — 85025 COMPLETE CBC W/AUTO DIFF WBC: CPT | Performed by: INTERNAL MEDICINE

## 2024-10-05 PROCEDURE — 84100 ASSAY OF PHOSPHORUS: CPT | Performed by: INTERNAL MEDICINE

## 2024-10-05 PROCEDURE — 99232 SBSQ HOSP IP/OBS MODERATE 35: CPT | Performed by: INTERNAL MEDICINE

## 2024-10-05 PROCEDURE — 93306 TTE W/DOPPLER COMPLETE: CPT

## 2024-10-05 PROCEDURE — 82948 REAGENT STRIP/BLOOD GLUCOSE: CPT

## 2024-10-05 PROCEDURE — 93306 TTE W/DOPPLER COMPLETE: CPT | Performed by: INTERNAL MEDICINE

## 2024-10-05 PROCEDURE — 93005 ELECTROCARDIOGRAM TRACING: CPT | Performed by: INTERNAL MEDICINE

## 2024-10-05 RX ORDER — SOTALOL HYDROCHLORIDE 80 MG/1
40 TABLET ORAL EVERY 12 HOURS SCHEDULED
Status: DISCONTINUED | OUTPATIENT
Start: 2024-10-05 | End: 2024-10-05

## 2024-10-05 RX ORDER — NYSTATIN 100000 [USP'U]/G
POWDER TOPICAL EVERY 12 HOURS SCHEDULED
Status: DISCONTINUED | OUTPATIENT
Start: 2024-10-05 | End: 2024-10-05 | Stop reason: HOSPADM

## 2024-10-05 RX ADMIN — ACETAMINOPHEN 650 MG: 325 TABLET ORAL at 11:24

## 2024-10-05 RX ADMIN — NYSTATIN: 100000 POWDER TOPICAL at 08:47

## 2024-10-05 RX ADMIN — ASPIRIN 81 MG 81 MG: 81 TABLET ORAL at 08:48

## 2024-10-05 RX ADMIN — AMLODIPINE BESYLATE 5 MG: 5 TABLET ORAL at 08:48

## 2024-10-05 RX ADMIN — ATORVASTATIN CALCIUM 80 MG: 40 TABLET, FILM COATED ORAL at 08:48

## 2024-10-05 RX ADMIN — ISOSORBIDE MONONITRATE 60 MG: 30 TABLET, EXTENDED RELEASE ORAL at 08:48

## 2024-10-05 RX ADMIN — Medication 10 ML: at 08:49

## 2024-10-05 RX ADMIN — PANTOPRAZOLE SODIUM 40 MG: 40 TABLET, DELAYED RELEASE ORAL at 08:47

## 2024-10-05 RX ADMIN — CLOPIDOGREL BISULFATE 75 MG: 75 TABLET ORAL at 08:48

## 2024-10-05 RX ADMIN — LISINOPRIL 20 MG: 10 TABLET ORAL at 08:48

## 2024-10-05 NOTE — PLAN OF CARE
Goal Outcome Evaluation:      Pt A&Ox4, RA, tele d/c'd, no c/o pain. Daughter at bedside. Discharge teaching complete, both parties in agreement with plan. IV removed. Tele box removed, cleaned and returned to nurses' station. This nurse transported pt to [facility]. Daughter to transport home.

## 2024-10-05 NOTE — DISCHARGE SUMMARY
[facility] Medicine Services  DISCHARGE SUMMARY    Patient Name: Sol Jung  : 1932  MRN: [id number]    Date of Admission: 10/4/2024  1:00 PM  Date of Discharge:  10/5/24  Primary Care Physician: Dorcas Lama APRN    Consults       Date and Time Order Name Status Description    10/4/2024  5:15 PM Inpatient Cardiology Consult Completed             Hospital Course     Presenting Problem: syncope vs cardiac arrest    Active Hospital Problems    Diagnosis  POA    **Loss of consciousness [R40.20]  Yes      Resolved Hospital Problems   No resolved problems to display.          Hospital Course:  Sol Jung is a 89 y.o. female with history of hypertension, PAF s/p remote ablation, CAD status post multiple stents, pulmonary hypertension, CKD 3 who presented after possible cardiac arrest with CPR administered.    The patient does not remember any of what happened and could not contribute much to HPI.  Her daughter at bedside helped with the history.  The patient went for routine visit to see her urologist and started to feel unwell while in the waiting room and wanted to leave but decided to wait and eventually went to see her provider.  While sitting in the chair, she felt severely lightheaded as if she is going to faint and then lost consciousness.  Her PA did not feel any pulse and started CPR.  Per her daughter, she received 3-4 cycles of CPR after which she regained full consciousness and was feeling very weak.  EMS was called and  they had to flew her here for higher level of care.     Syncope versus cardiac arrest  -Status post several rounds of chest compressions  -Echo shows EF 66 to 70% with diastolic dysfunction  -EKG sinus   -patient seen and evaluated by Cardiology, vasovagal syncope favored     CAD  - continue home plavix     HTN  - imdur     PAF  - sotolol continued  - reportedly not on eliquis     Lung nodule  -3 mm  - discussed with patient and  family  -Consider follow-up per Fleischner criteria     GERD     Chronic hyponatremia  - sodium 129, improved to 132 by discharge  - bmp am     Generalized weakness  - PT/OT     Asymptomatic bacteriuria vs UTI  - h/o ESBL Klebsiella UTI  - afebrile, no leukocytosis  - discussed UA findings with patient and family.  Patient denies dysuria or abdominal pain.    - family states Urology planning to place Ms Jung on suppressive therapy for chronic UTIs  - will defer further antibiotics at present      Discharge Follow Up Recommendations for outpatient labs/diagnostics:   PCP to order followup CT imaging as clinically relevant for 3 mm lung nodule    Day of Discharge     HPI:   Feels fine. No fever, chills, nausea or dysuria.  Chest still a little sore from chest compressions.      Would like to go home today.  Does not want to wait for PT evaluation.  Daughter at bedside says family provides attentive care at home.     Review of Systems  Gen: no fevers    Vital Signs:   Temp:  [96.3 °F (35.7 °C)-98.1 °F (36.7 °C)] 98 °F (36.7 °C)  Heart Rate:  [65-73] 72  Resp:  [16-18] 16  BP: (125-189)/() 127/75      Physical Exam:  NAD, in bed  [employer]  MM moist  RRR  CTAB  Abd soft, NT  Normal affect  Awake, speech clear    Pertinent  and/or Most Recent Results     LAB RESULTS:      Lab 10/05/24  1316 10/04/24  1313   WBC 5.57 7.73   HEMOGLOBIN 12.1 14.1   HEMATOCRIT 35.2 39.8   PLATELETS 187 222   NEUTROS ABS 2.80 4.95   IMMATURE GRANS (ABS) 0.02 0.06*   LYMPHS ABS 1.86 1.99   MONOS ABS 0.51 0.43   EOS ABS 0.32 0.24   MCV 99.2* 96.1   D DIMER QUANT  --  3.13*         Lab 10/05/24  1316 10/04/24  1313   SODIUM 132* 129*   POTASSIUM 3.7 4.2   CHLORIDE 104 97*   CO2 24.0 24.0   ANION GAP 4.0* 8.0   BUN 18 24*   CREATININE 1.13* 1.27*   EGFR 45.7* 39.8*   GLUCOSE 102* 102*   CALCIUM 8.5 9.3   MAGNESIUM 1.6* 1.8   PHOSPHORUS 3.3  --          Lab 10/05/24  1316 10/04/24  1313   TOTAL PROTEIN 6.4 7.7   ALBUMIN 3.6 4.2   GLOBULIN 2.8  3.5   ALT (SGPT) 15 21   AST (SGOT) 20 27   BILIRUBIN 0.6 0.9   ALK PHOS 45 58         Lab 10/04/24  1726 10/04/24  1313   HSTROP T 39* 45*                 Brief Urine Lab Results  (Last result in the past 365 days)        Color   Clarity   Blood   Leuk Est   Nitrite   Protein   CREAT   Urine HCG        10/04/24 2304 Yellow   Cloudy   Negative   Moderate (2+)   Negative   Negative                 Microbiology Results (last 10 days)       ** No results found for the last 240 hours. **            Adult Transthoracic Echo Complete W/ Cont if Necessary Per Protocol    Result Date: 10/5/2024    Left ventricular ejection fraction appears to be 66 - 70%.   Left ventricular wall thickness is consistent with mild to moderate septal asymmetric hypertrophy.   Left ventricular diastolic function is consistent with (grade I) impaired relaxation.   The left atrial cavity is mildly dilated.   Left atrial volume is mildly increased.   There is mild calcification of the aortic valve.   Estimated right ventricular systolic pressure from tricuspid regurgitation is normal (<35 mmHg).     CT Angiogram Chest    Result Date: 10/4/2024  CT ANGIOGRAM CHEST Date of Exam: 10/4/2024 2:30 PM EDT Indication: found down, had cpr, + d dimer, eval rib fx, pe. Comparison: None available. Technique: CTA of the chest was performed after the uneventful intravenous administration of 75 cc Isovue-370. Reconstructed coronal and sagittal images were also obtained. In addition, a 3-D volume rendered image was created for interpretation. Automated exposure control and iterative reconstruction methods were used. Findings: PULMONARY VASCULATURE: Pulmonary arteries are widely patent without evidence of embolus.  Main pulmonary artery is normal in size. No evidence of right heart strain. MEDIASTINUM: Mild mural thickening with fluid and gas distention of the esophagus. Correlate for signs of reflux induced esophagitis. Aortic size is normal. Heart is enlarged.  No aortic dissection identified. No mass nor pericardial effusion. CORONARY ARTERIES: There is calcified atherosclerotic disease. LUNGS: Minimal basilar atelectasis. No dense consolidation. No significant nodule nor interstitial changes. There is a 3 mm right upper lobe nodule (image 21, series 6). PLEURAL SPACE: No effusion, mass, nor pneumothorax. LYMPH NODES: There are no pathologically enlarged lymph nodes. UPPER ABDOMEN: Unremarkable OSSEOUS STRUCTURES: Appropriate for age with no acute process identified.     Impression: 1.No evidence of pulmonary embolism no acute pulmonary process. 2.Imaging features suggestive of reflux and esophagitis. Correlate with symptoms. 3.There is a 3 mm right upper lobe pulmonary nodule doubtful clinical significance. See below recommendations of follow-up desired. The Fleischner society pulmonary nodule recommendations are for the follow-up and management of pulmonary nodules smaller than 8 mm detected incidentally in patients >35 years on non-screening CT. The initial guidelines for the management of solid nodules were released in 2005 1, and guidelines for the management of subsolid nodules were released in 2013 2. New revised 2017 recommendations for both solid and subsolid have since been released 4. 2017 guidelines Solid nodules Solitary nodule size: <6 mm *low risk patients: no follow-up needed *high risk patients: optional CT at 12 months Solitary nodule size: 6-8 mm *low risk patients: follow-up at 6-12 months, then consider further follow-up at 18-24 months *high risk patients: initial follow-up CT at 6-12 months and then at 18-24 months if no change Solitary nodule size: >8 mm *either low or high risk patients *consider follow-up CT at 3 months, and/or CT-PET, and/or biopsy Multiple nodules size: <6 mm *low risk patients: no routine follow-up *high risk patients: optional CT at 12 months Multiple nodules size: 6-8 mm *low risk patients: follow-up at 3-6 months, then  "consider further follow-up at 18-24 months *high risk patients: follow-up at 3-6 months, then at 18-24 months if no change Multiple nodules size: >8 mm *low risk patients: follow-up at 3-6 months, then consider further follow-up at 18-24 months *high risk patients: follow-up at 3-6 months, then at 18-24 months if no change * Note: newly detected indeterminate nodule in persons 35 years of age or older. *low risk patients: minimal or absent history of smoking and or other known risk factors *high risk patients: history of smoking or of other known risk factors (e.g. first degree relative with lung cancer, or exposure to asbestos, radon, uranium) *if a nodule up to 8 mm is partly solid or is ground glass further follow-up is required after 24 months to exclude possible slow growing adenocarcinoma (MISTY) Subsolid nodules Solitary pure ground-glass nodule *nodule size <6mm *no CT follow-up required *nodule size \"e6mm *follow up CT at 6-12 months, then every 2 years until 5 years Solitary part-solid nodule *nodule size <6mm *no CT follow-up required *nodule size \"e6mm *follow-up CT at 3-6 months *if unchanged, and solid component remains <6mm, then annual follow-up for 5 years Multiple subsolid nodules *nodule size <6mm *follow-up CT at 3-6 months *consider further follow-up at 2 and 4 years if stable *nodule size \"e6mm *follow-up CT at 3-6 months *subsequent management based on the most suspicious nodule(s) Electronically Signed: Hira Oneill MD  10/4/2024 2:51 PM EDT  Workstation ID: [id number]    CT Head Without Contrast    Result Date: 10/4/2024  CT HEAD WO CONTRAST Date of Exam: 10/4/2024 2:32 PM EDT Indication: ams, found down. Comparison: None available. Technique: Axial CT images were obtained of the head without contrast administration.  Automated exposure control and iterative construction methods were used. Findings: No acute intracranial hemorrhage. No acute large territory infarct. There are patchy and " confluent areas of subcortical and periventricular white matter hypodensity which is nonspecific and can be seen in the setting of chronic small vessel ischemic change. There is intracranial atherosclerosis. No extra-axial collections. No midline shift or herniation. Normal size and configuration of the ventricles. Normal appearance of the orbits. The paranasal sinuses are clear. The mastoid air cells are clear.  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     Impression: No acute intracranial findings. Chronic and senescent changes as above. Electronically Signed: Ryder Sierra MD  10/4/2024 2:39 PM EDT  Workstation ID: [id number]    XR Chest 1 View    Result Date: 10/4/2024  XR CHEST 1 VW Date of Exam: 10/4/2024 1:22 PM EDT Indication: Weak/Dizzy/AMS triage protocol Comparison: None available. Findings: Cardiomediastinal silhouette is within normal limits. No focal opacity, pleural effusion or pneumothorax. No evidence of acute osseous abnormalities. Visualized upper abdomen is unremarkable.     Impression: No radiographic evidence of acute cardiopulmonary process. Electronically Signed: Neil Curtis MD  10/4/2024 1:55 PM EDT  Workstation ID: [id number]             Results for orders placed during the hospital encounter of 10/04/24    Adult Transthoracic Echo Complete W/ Cont if Necessary Per Protocol    Interpretation Summary    Left ventricular ejection fraction appears to be 66 - 70%.    Left ventricular wall thickness is consistent with mild to moderate septal asymmetric hypertrophy.    Left ventricular diastolic function is consistent with (grade I) impaired relaxation.    The left atrial cavity is mildly dilated.    Left atrial volume is mildly increased.    There is mild calcification of the aortic valve.    Estimated right ventricular systolic pressure from tricuspid regurgitation is normal (<35 mmHg).      Plan for Follow-up of Pending Labs/Results:     Discharge  Details        Discharge Medications        Continue These Medications        Instructions Start Date   amitriptyline 10 MG tablet  Commonly known as: ELAVIL   10 mg, Oral, Every Night at Bedtime      amLODIPine 5 MG tablet  Commonly known as: NORVASC   1 tablet, Oral, Daily      cholecalciferol 25 MCG (1000 UT) tablet  Commonly known as: VITAMIN D3   1,000 Units, Oral, Daily, OTC      citalopram 10 MG tablet  Commonly known as: CeleXA   Take 1 tablet by mouth Every Morning.      clopidogrel 75 MG tablet  Commonly known as: PLAVIX   75 mg, Oral, Daily      famotidine 40 MG tablet  Commonly known as: PEPCID   40 mg, Oral, Daily      isosorbide mononitrate 60 MG 24 hr tablet  Commonly known as: IMDUR   60 mg, Oral, Daily      lisinopril 20 MG tablet  Commonly known as: PRINIVIL,ZESTRIL   20 mg, Oral, Daily      mirtazapine 15 MG disintegrating tablet  Commonly known as: REMERON SOL-TAB   15 mg, Oral, Nightly      Nitrostat 0.4 MG SL tablet  Generic drug: nitroglycerin   0.4 mg, Oral, Every 5 Minutes PRN      pantoprazole 40 MG EC tablet  Commonly known as: PROTONIX   40 mg, Oral, Daily      potassium chloride 10 MEQ CR capsule  Commonly known as: MICRO-K   10 mEq, Oral, Daily      Slow-Mag 71.5-119 MG tablet delayed-release tablet  Generic drug: magnesium cl-calcium carbonate   1 tablet, Oral, Daily      sotalol 80 MG tablet  Commonly known as: BETAPACE   40 mg, Oral, 2 Times Daily      traZODone 100 MG tablet  Commonly known as: DESYREL   100 mg, Oral, Nightly      vitamin B-12 100 MCG tablet  Commonly known as: CYANOCOBALAMIN   100 mcg, Oral, Daily, OTC               No Known Allergies      Discharge Disposition:  Home or Self Care    Diet:  Hospital:  Diet Order   Procedures    Diet: Cardiac; Healthy Heart (2-3 Na+); Fluid Consistency: Thin (IDDSI 0)            Activity:      Restrictions or Other Recommendations:         CODE STATUS:    Code Status and Medical Interventions: CPR (Attempt to Resuscitate); Full  Support   Ordered at: 10/04/24 1715     Level Of Support Discussed With:    Patient    Next of Kin (If No Surrogate)     Code Status (Patient has no pulse and is not breathing):    CPR (Attempt to Resuscitate)     Medical Interventions (Patient has pulse or is breathing):    Full Support       Future Appointments   Date Time Provider Department Center   11/6/2024 10:30 AM Patty Martinez APRN [facility]       Additional Instructions for the Follow-ups that You Need to Schedule       Discharge Follow-up with PCP   As directed       Currently Documented PCP:    Dorcas Lama APRN    PCP Phone Number:    [phone]     Follow Up Details: follow up with PCP in one week                      Vel Lucero MD  10/05/24      Time Spent on Discharge:  I spent  35  minutes on this discharge activity which included: face-to-face encounter with the patient, reviewing the data in the system, coordination of the care with the nursing staff as well as consultants, documentation, and entering orders.

## 2024-10-05 NOTE — PROGRESS NOTES
[facility] Medicine Services  PROGRESS NOTE    Patient Name: Sol Jung  : 1932  MRN: [id number]    Date of Admission: 10/4/2024  Primary Care Physician: Dorcas Lama APRN    Subjective   Subjective     CC:  Syncope versus cardiac arrest    HPI:  Feels fine today, however her chest feels sore after chest compressions yesterday.  Overall weak in general but not far from baseline      Objective   Objective     Vital Signs:   Temp:  [96.3 °F (35.7 °C)-98.9 °F (37.2 °C)] 98.1 °F (36.7 °C)  Heart Rate:  [65-76] 72  Resp:  [16-18] 18  BP: (125-189)/() 125/69  Flow (L/min):  [0-2] 0     Physical Exam:  Nontoxic, appears chronically deconditioned, in bed  Mucous membranes moist  Breath sounds grossly clear bilaterally  RRR  Abdomen soft, nontender  Trace edema  Awake, speech clear  Normal affect    Daughter at bedside      Results Reviewed:  LAB RESULTS:      Lab 10/04/24  1726 10/04/24  1313   WBC  --  7.73   HEMOGLOBIN  --  14.1   HEMATOCRIT  --  39.8   PLATELETS  --  222   NEUTROS ABS  --  4.95   IMMATURE GRANS (ABS)  --  0.06*   LYMPHS ABS  --  1.99   MONOS ABS  --  0.43   EOS ABS  --  0.24   MCV  --  96.1   D DIMER QUANT  --  3.13*   HSTROP T 39* 45*         Lab 10/04/24  1313   SODIUM 129*   POTASSIUM 4.2   CHLORIDE 97*   CO2 24.0   ANION GAP 8.0   BUN 24*   CREATININE 1.27*   EGFR 39.8*   GLUCOSE 102*   CALCIUM 9.3   MAGNESIUM 1.8         Lab 10/04/24  1313   TOTAL PROTEIN 7.7   ALBUMIN 4.2   GLOBULIN 3.5   ALT (SGPT) 21   AST (SGOT) 27   BILIRUBIN 0.9   ALK PHOS 58         Lab 10/04/24  1726 10/04/24  1313   HSTROP T 39* 45*                 Brief Urine Lab Results  (Last result in the past 365 days)        Color   Clarity   Blood   Leuk Est   Nitrite   Protein   CREAT   Urine HCG        10/04/24 2304 Yellow   Cloudy   Negative   Moderate (2+)   Negative   Negative                   Microbiology Results Abnormal       None            Adult Transthoracic Echo  Complete W/ Cont if Necessary Per Protocol    Result Date: 10/5/2024    Left ventricular ejection fraction appears to be 66 - 70%.   Left ventricular wall thickness is consistent with mild to moderate septal asymmetric hypertrophy.   Left ventricular diastolic function is consistent with (grade I) impaired relaxation.   The left atrial cavity is mildly dilated.   Left atrial volume is mildly increased.   There is mild calcification of the aortic valve.   Estimated right ventricular systolic pressure from tricuspid regurgitation is normal (<35 mmHg).     CT Angiogram Chest    Result Date: 10/4/2024  CT ANGIOGRAM CHEST Date of Exam: 10/4/2024 2:30 PM EDT Indication: found down, had cpr, + d dimer, eval rib fx, pe. Comparison: None available. Technique: CTA of the chest was performed after the uneventful intravenous administration of 75 cc Isovue-370. Reconstructed coronal and sagittal images were also obtained. In addition, a 3-D volume rendered image was created for interpretation. Automated exposure control and iterative reconstruction methods were used. Findings: PULMONARY VASCULATURE: Pulmonary arteries are widely patent without evidence of embolus.  Main pulmonary artery is normal in size. No evidence of right heart strain. MEDIASTINUM: Mild mural thickening with fluid and gas distention of the esophagus. Correlate for signs of reflux induced esophagitis. Aortic size is normal. Heart is enlarged. No aortic dissection identified. No mass nor pericardial effusion. CORONARY ARTERIES: There is calcified atherosclerotic disease. LUNGS: Minimal basilar atelectasis. No dense consolidation. No significant nodule nor interstitial changes. There is a 3 mm right upper lobe nodule (image 21, series 6). PLEURAL SPACE: No effusion, mass, nor pneumothorax. LYMPH NODES: There are no pathologically enlarged lymph nodes. UPPER ABDOMEN: Unremarkable OSSEOUS STRUCTURES: Appropriate for age with no acute process identified.      Impression: Impression: 1.No evidence of pulmonary embolism no acute pulmonary process. 2.Imaging features suggestive of reflux and esophagitis. Correlate with symptoms. 3.There is a 3 mm right upper lobe pulmonary nodule doubtful clinical significance. See below recommendations of follow-up desired. The Fleischner society pulmonary nodule recommendations are for the follow-up and management of pulmonary nodules smaller than 8 mm detected incidentally in patients >35 years on non-screening CT. The initial guidelines for the management of solid nodules were released in 2005 1, and guidelines for the management of subsolid nodules were released in 2013 2. New revised 2017 recommendations for both solid and subsolid have since been released 4. 2017 guidelines Solid nodules Solitary nodule size: <6 mm *low risk patients: no follow-up needed *high risk patients: optional CT at 12 months Solitary nodule size: 6-8 mm *low risk patients: follow-up at 6-12 months, then consider further follow-up at 18-24 months *high risk patients: initial follow-up CT at 6-12 months and then at 18-24 months if no change Solitary nodule size: >8 mm *either low or high risk patients *consider follow-up CT at 3 months, and/or CT-PET, and/or biopsy Multiple nodules size: <6 mm *low risk patients: no routine follow-up *high risk patients: optional CT at 12 months Multiple nodules size: 6-8 mm *low risk patients: follow-up at 3-6 months, then consider further follow-up at 18-24 months *high risk patients: follow-up at 3-6 months, then at 18-24 months if no change Multiple nodules size: >8 mm *low risk patients: follow-up at 3-6 months, then consider further follow-up at 18-24 months *high risk patients: follow-up at 3-6 months, then at 18-24 months if no change * Note: newly detected indeterminate nodule in persons 35 years of age or older. *low risk patients: minimal or absent history of smoking and or other known risk factors *high risk  "patients: history of smoking or of other known risk factors (e.g. first degree relative with lung cancer, or exposure to asbestos, radon, uranium) *if a nodule up to 8 mm is partly solid or is ground glass further follow-up is required after 24 months to exclude possible slow growing adenocarcinoma (MISTY) Subsolid nodules Solitary pure ground-glass nodule *nodule size <6mm *no CT follow-up required *nodule size \"e6mm *follow up CT at 6-12 months, then every 2 years until 5 years Solitary part-solid nodule *nodule size <6mm *no CT follow-up required *nodule size \"e6mm *follow-up CT at 3-6 months *if unchanged, and solid component remains <6mm, then annual follow-up for 5 years Multiple subsolid nodules *nodule size <6mm *follow-up CT at 3-6 months *consider further follow-up at 2 and 4 years if stable *nodule size \"e6mm *follow-up CT at 3-6 months *subsequent management based on the most suspicious nodule(s) Electronically Signed: Hira Oneill MD  10/4/2024 2:51 PM EDT  Workstation ID: [id number]    CT Head Without Contrast    Result Date: 10/4/2024  CT HEAD WO CONTRAST Date of Exam: 10/4/2024 2:32 PM EDT Indication: ams, found down. Comparison: None available. Technique: Axial CT images were obtained of the head without contrast administration.  Automated exposure control and iterative construction methods were used. Findings: No acute intracranial hemorrhage. No acute large territory infarct. There are patchy and confluent areas of subcortical and periventricular white matter hypodensity which is nonspecific and can be seen in the setting of chronic small vessel ischemic change. There is intracranial atherosclerosis. No extra-axial collections. No midline shift or herniation. Normal size and configuration of the ventricles. Normal appearance of the orbits. The paranasal sinuses are clear. The mastoid air cells are clear.  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 "     Impression: Impression: No acute intracranial findings. Chronic and senescent changes as above. Electronically Signed: Ryder Sierra MD  10/4/2024 2:39 PM EDT  Workstation ID: [id number]    XR Chest 1 View    Result Date: 10/4/2024  XR CHEST 1 VW Date of Exam: 10/4/2024 1:22 PM EDT Indication: Weak/Dizzy/AMS triage protocol Comparison: None available. Findings: Cardiomediastinal silhouette is within normal limits. No focal opacity, pleural effusion or pneumothorax. No evidence of acute osseous abnormalities. Visualized upper abdomen is unremarkable.     Impression: Impression: No radiographic evidence of acute cardiopulmonary process. Electronically Signed: Neil Curtis MD  10/4/2024 1:55 PM EDT  Workstation ID: [id number]     Results for orders placed during the hospital encounter of 10/04/24    Adult Transthoracic Echo Complete W/ Cont if Necessary Per Protocol    Interpretation Summary    Left ventricular ejection fraction appears to be 66 - 70%.    Left ventricular wall thickness is consistent with mild to moderate septal asymmetric hypertrophy.    Left ventricular diastolic function is consistent with (grade I) impaired relaxation.    The left atrial cavity is mildly dilated.    Left atrial volume is mildly increased.    There is mild calcification of the aortic valve.    Estimated right ventricular systolic pressure from tricuspid regurgitation is normal (<35 mmHg).      Current medications:  Scheduled Meds:amitriptyline, 10 mg, Oral, Nightly  amLODIPine, 5 mg, Oral, Daily  aspirin, 81 mg, Oral, Daily  atorvastatin, 80 mg, Oral, Daily  citalopram, 10 mg, Oral, Daily  clopidogrel, 75 mg, Oral, Daily  isosorbide mononitrate, 60 mg, Oral, Daily  lisinopril, 20 mg, Oral, Daily  mirtazapine, 15 mg, Oral, Nightly  nystatin, , Topical, Q12H  pantoprazole, 40 mg, Oral, Daily  sodium chloride, 10 mL, Intravenous, Q12H  sotalol, 80 mg, Oral, Q12H  vitamin B-12, 50 mcg, Oral, Daily      Continuous Infusions:sodium  chloride, 75 mL/hr, Last Rate: 75 mL/hr (10/04/24 1928)      PRN Meds:.  acetaminophen **OR** acetaminophen **OR** acetaminophen    senna-docusate sodium **AND** polyethylene glycol **AND** bisacodyl **AND** bisacodyl    calcium carbonate    Calcium Replacement - Follow Nurse / BPA Driven Protocol    LORazepam    Magnesium Standard Dose Replacement - Follow Nurse / BPA Driven Protocol    melatonin    nitroglycerin    ondansetron ODT **OR** ondansetron    Phosphorus Replacement - Follow Nurse / BPA Driven Protocol    Potassium Replacement - Follow Nurse / BPA Driven Protocol    sodium chloride    sodium chloride    sodium chloride    Assessment & Plan   Assessment & Plan     Active Hospital Problems    Diagnosis  POA    **Loss of consciousness [R40.20]  Yes      Resolved Hospital Problems   No resolved problems to display.        Brief Hospital Course to date:  Sol Jung is a 89 y.o. female with history of hypertension, PAF s/p remote ablation, CAD status post multiple stents, pulmonary hypertension, CKD 3 who presented after possible cardiac arrest with CPR administered.    Syncope versus cardiac arrest  -Status post several rounds of chest compressions  -Echo shows EF 66 to 70% with diastolic dysfunction  -EKG sinus     CAD  - asa, plavix    HTN  - imdur    PAF  - sotolol, dosing per cardiology  - reportedly not on eliquis    Lung nodule  -3 mm  -Consider follow-up per Fleischner criteria    GERD    Chronic hyponatremia  - sodium 129  - bmp am    Generalized weakness  - PT/OT    Asymptomatic bacteriuria vs UTI  - h/o ESBL Klebsiella UTI  - afebrile, no leukocytosis  - will monitor clinically  - cbc am    Expected Discharge Location and Transportation:   Expected Discharge   Expected discharge date/ time has not been documented.     VTE Prophylaxis:  No VTE prophylaxis order currently exists.         AM-PAC 6 Clicks Score (PT): 13 (10/04/24 1940)    CODE STATUS:   Code Status and Medical Interventions: CPR  (Attempt to Resuscitate); Full Support   Ordered at: 10/04/24 1836     Level Of Support Discussed With:    Patient    Next of Kin (If No Surrogate)     Code Status (Patient has no pulse and is not breathing):    CPR (Attempt to Resuscitate)     Medical Interventions (Patient has pulse or is breathing):    Full Support       Vel Lucero MD  10/05/24

## 2024-10-05 NOTE — CONSULTS
Date of Hospital Visit: 10/05/24  Encounter Provider: Nathaly Pelletier MD  Place of Service: [facility]  Patient Name: Sol Jung  :1932  Referral Provider: No ref. provider found  Primary Care Provider: Dorcas Lama APRN    Chief complaint/Reason for Consultation: Syncope and collapse      History of Present Illness:  Patient is a 89 years old lady who went to see her urologist.  She was in the waiting room/patient office for about 40 minutes.  Patient started feeling dizzy lightheadedness and is about to pass out.  Patient told the family members that she is not feeling good and they should go home.  Since they were in the office with thought that they will see the doctor.  By the time neurologist came to see she almost passed out.  She had laid down on the floor and CPR was started and she started coming back within few minutes.  EMS was called and patient was brought to the hospital.  Patient came to the life after few minutes.    Patient denies any chest pain or any dizziness or any other symptoms.      Problem List:  CARDIAC  Coronary Artery Disease:   ACS, 2 years ago: 2 stents were placed.     Myocardium:   Echo, 10/5/2024: LVEF 60%, LVH, LEWIS     Valvular:   Mild aortic valvular disease     Electrical:   A-fib status post ablation    Pericardium:   Normal     VASCULAR  Arterial  Cerebrovascular disease:   CT head, 10/4/2024: No acute changes     CARDIAC RISK FACTORS  Hypertension  Dyslipidemia    NON-CARDIAC  Limited activity      SURGERIES  None            Medications Prior to Admission   Medication Sig Dispense Refill Last Dose    amitriptyline (ELAVIL) 10 MG tablet Take 1 tablet by mouth every night at bedtime.   10/3/2024    amLODIPine (NORVASC) 5 MG tablet Take 1 tablet by mouth Daily.   10/4/2024    Cholecalciferol 25 MCG (1000 UT) tablet Take 1 tablet by mouth Daily. OTC   10/4/2024    citalopram (CeleXA) 10 MG tablet Take 1 tablet by mouth Every Morning.    10/4/2024    clopidogrel (PLAVIX) 75 MG tablet Take 1 tablet by mouth Daily. 90 tablet 3 10/4/2024    famotidine (PEPCID) 40 MG tablet Take 1 tablet by mouth Daily.   10/4/2024    isosorbide mononitrate (IMDUR) 60 MG 24 hr tablet Take 1 tablet by mouth Daily. 90 tablet 3 10/4/2024    lisinopril (PRINIVIL,ZESTRIL) 20 MG tablet Take 1 tablet by mouth Daily.   10/4/2024    magnesium cl-calcium carbonate (Slow-Mag) 71.5-119 MG tablet delayed-release tablet Take 1 tablet by mouth Daily.   10/4/2024    mirtazapine (REMERON SOL-TAB) 15 MG disintegrating tablet Take 1 tablet by mouth Every Night.   10/3/2024    pantoprazole (PROTONIX) 40 MG EC tablet Take 1 tablet by mouth Daily.   10/4/2024    potassium chloride (MICRO-K) 10 MEQ CR capsule Take 1 capsule by mouth Daily.   10/4/2024    sotalol (BETAPACE) 80 MG tablet Take 0.5 tablets by mouth 2 (Two) Times a Day.   10/4/2024    traZODone (DESYREL) 100 MG tablet Take 1 tablet by mouth Every Night.   10/3/2024    vitamin B-12 (CYANOCOBALAMIN) 100 MCG tablet Take 1 tablet by mouth Daily. OTC   10/4/2024    nitroglycerin (Nitrostat) 0.4 MG SL tablet Take 1 tablet by mouth Every 5 (Five) Minutes As Needed for Chest Pain.   Unknown       Social History     Socioeconomic History    Marital status:    Tobacco Use    Smoking status: Never    Smokeless tobacco: Never   Vaping Use    Vaping status: Never Used   Substance and Sexual Activity    Alcohol use: Not Currently    Drug use: Never    Sexual activity: Defer       Family History   Problem Relation Age of Onset    No Known Problems Mother     No Known Problems Father        REVIEW OF SYSTEMS:   Review of Systems   Neurological:  Positive for syncope.             Objective:     Vitals:    10/05/24 0816 10/05/24 0848 10/05/24 1056 10/05/24 1152   BP: 158/88 158/88  125/69   BP Location: Left arm   Left arm   Patient Position: Lying   Sitting   Pulse: 73 71  72   Resp: 18   18   Temp: 98.1 °F (36.7 °C)      TempSrc: Oral     "  SpO2: 96%   97%   Weight:   88 kg (194 lb)    Height:   170.2 cm (67\")        Body mass index is 30.38 kg/m².  Flowsheet Rows      Flowsheet Row First Filed Value   Admission Height 170.2 cm (67\") Documented at 10/05/2024 1056   Admission Weight 88 kg (194 lb) Documented at 10/04/2024 2012            Physical Exam     Constitutional:       General: Not in acute distress.     Appearance: Healthy appearance. Not in distress.     Neck:     JVP:Not elevated     Carotid artery: Normal    Pulmonary:      Effort: Pulmonary effort is normal.      Breath sounds: Normal breath sounds. No wheezing. No rhonchi. No rales.     Cardiovascular:      Normal rate. Regular rhythm. Normal S1. Normal S2.      Murmurs: There is mild systolic murmur.      No gallop. No click. No rub.     Abdominal:      General: Bowel sounds are normal.      Palpations: Abdomen is soft.      Tenderness: There is no abdominal tenderness.    Extremities:     Pulses:Normal radial and pedal pulses     Edema:no edema        Lab Review:                Results from last 7 days   Lab Units 10/04/24  1313   SODIUM mmol/L 129*   POTASSIUM mmol/L 4.2   CHLORIDE mmol/L 97*   CO2 mmol/L 24.0   BUN mg/dL 24*   CREATININE mg/dL 1.27*   GLUCOSE mg/dL 102*   CALCIUM mg/dL 9.3     Results from last 7 days   Lab Units 10/04/24  1726 10/04/24  1313   HSTROP T ng/L 39* 45*     Results from last 7 days   Lab Units 10/04/24  1313   WBC 10*3/mm3 7.73   HEMOGLOBIN g/dL 14.1   HEMATOCRIT % 39.8   PLATELETS 10*3/mm3 222         Results from last 7 days   Lab Units 10/04/24  1313   MAGNESIUM mg/dL 1.8               EKG: Normal sinus rhythm with first-degree AV block    CXR: Normal    No data recorded       Assessment:   Vasovagal syncope  Atrial fibrillation  Coronary artery disease  Chronic kidney disease      Plan:   I believe patient has a vasovagal response.  We do not need to pursue any further investigation at this time.  Patient has been in sinus rhythm on sotalol.  Patient " can be discharged home today.

## 2024-10-05 NOTE — PLAN OF CARE
Problem: Adult Inpatient Plan of Care  Goal: Plan of Care Review  Outcome: Progressing  Flowsheets (Taken 10/5/2024 0608)  Progress: improving  Plan of Care Reviewed With: patient  Goal: Patient-Specific Goal (Individualized)  Outcome: Progressing  Goal: Absence of Hospital-Acquired Illness or Injury  Outcome: Progressing  Intervention: Identify and Manage Fall Risk  Recent Flowsheet Documentation  Taken 10/5/2024 0603 by Gaetano Saunders RN  Safety Promotion/Fall Prevention:   activity supervised   fall prevention program maintained   safety round/check completed  Taken 10/5/2024 0400 by Gaetano Saunders RN  Safety Promotion/Fall Prevention:   activity supervised   fall prevention program maintained   safety round/check completed  Taken 10/5/2024 0153 by Gaetano Saunders RN  Safety Promotion/Fall Prevention:   activity supervised   fall prevention program maintained   safety round/check completed  Taken 10/4/2024 2353 by Gaetano Saunders RN  Safety Promotion/Fall Prevention:   activity supervised   fall prevention program maintained   safety round/check completed  Taken 10/4/2024 2154 by Gaetano Saunders RN  Safety Promotion/Fall Prevention:   activity supervised   fall prevention program maintained   safety round/check completed  Taken 10/4/2024 2002 by Gaetano Saunders RN  Safety Promotion/Fall Prevention:   activity supervised   fall prevention program maintained   safety round/check completed  Intervention: Prevent Skin Injury  Recent Flowsheet Documentation  Taken 10/5/2024 0603 by Gaetano Saunders RN  Body Position: position changed independently  Skin Protection: adhesive use limited  Taken 10/5/2024 0400 by Gaetano Saunders RN  Body Position: position changed independently  Skin Protection: adhesive use limited  Taken 10/5/2024 0153 by Gaetano Saunders RN  Body Position: position changed independently  Skin Protection: adhesive use limited  Taken 10/4/2024 2353 by Naresh  Gaetano Mendez RN  Body Position: position changed independently  Skin Protection: adhesive use limited  Taken 10/4/2024 2154 by Gaetano Saunders RN  Body Position: position changed independently  Skin Protection: adhesive use limited  Taken 10/4/2024 2002 by Gaetano Saunders RN  Body Position: position changed independently  Skin Protection: adhesive use limited  Intervention: Prevent and Manage VTE (Venous Thromboembolism) Risk  Recent Flowsheet Documentation  Taken 10/5/2024 0603 by Gaetano Saunders RN  Activity Management: activity encouraged  Range of Motion: active ROM (range of motion) encouraged  Taken 10/5/2024 0400 by Gaetano Saunders RN  Activity Management: activity encouraged  Range of Motion: active ROM (range of motion) encouraged  Taken 10/5/2024 0153 by Gaetano Saunders RN  Activity Management: activity encouraged  Range of Motion: active ROM (range of motion) encouraged  Taken 10/4/2024 2353 by Gaetano Saunders RN  Activity Management: activity encouraged  Range of Motion: active ROM (range of motion) encouraged  Taken 10/4/2024 2154 by Gaetano Saunders RN  Activity Management: activity encouraged  Range of Motion: active ROM (range of motion) encouraged  Taken 10/4/2024 2002 by Gaetano Saunders RN  Activity Management: activity encouraged  Range of Motion: active ROM (range of motion) encouraged  Intervention: Prevent Infection  Recent Flowsheet Documentation  Taken 10/5/2024 0603 by Gaetano Saunders RN  Infection Prevention:   environmental surveillance performed   equipment surfaces disinfected   hand hygiene promoted   personal protective equipment utilized   rest/sleep promoted   single patient room provided  Taken 10/5/2024 0400 by Gaetano Saunders RN  Infection Prevention:   environmental surveillance performed   equipment surfaces disinfected   hand hygiene promoted   personal protective equipment utilized   rest/sleep promoted   single patient room  provided  Taken 10/5/2024 0153 by Gaetano Saunders, RN  Infection Prevention:   environmental surveillance performed   equipment surfaces disinfected   hand hygiene promoted   personal protective equipment utilized   rest/sleep promoted   single patient room provided  Taken 10/4/2024 2353 by Gaetano Saunders, RN  Infection Prevention:   environmental surveillance performed   equipment surfaces disinfected   hand hygiene promoted   personal protective equipment utilized   rest/sleep promoted   single patient room provided  Taken 10/4/2024 2154 by Gaetano Saunders, RN  Infection Prevention:   environmental surveillance performed   equipment surfaces disinfected   hand hygiene promoted   personal protective equipment utilized   rest/sleep promoted   single patient room provided  Taken 10/4/2024 2002 by Gaetano Saunders RN  Infection Prevention:   environmental surveillance performed   equipment surfaces disinfected   hand hygiene promoted   personal protective equipment utilized   rest/sleep promoted   single patient room provided  Goal: Optimal Comfort and Wellbeing  Outcome: Progressing  Intervention: Provide Person-Centered Care  Recent Flowsheet Documentation  Taken 10/5/2024 0603 by Gaetano Saunders RN  Trust Relationship/Rapport:   care explained   choices provided  Taken 10/5/2024 0400 by Gaetano Saunders RN  Trust Relationship/Rapport:   care explained   choices provided  Taken 10/5/2024 0153 by Gaetano Saunders, RN  Trust Relationship/Rapport:   care explained   choices provided  Taken 10/4/2024 2353 by Gaetano Saunders RN  Trust Relationship/Rapport:   care explained   choices provided  Taken 10/4/2024 2154 by Gaetano Saunders RN  Trust Relationship/Rapport:   care explained   choices provided  Taken 10/4/2024 2002 by Gaetano Saunders RN  Trust Relationship/Rapport:   care explained   choices provided  Goal: Readiness for Transition of Care  Outcome: Progressing      Problem: Hypertension Comorbidity  Goal: Blood Pressure in Desired Range  Outcome: Progressing  Intervention: Maintain Blood Pressure Management  Recent Flowsheet Documentation  Taken 10/5/2024 0603 by Gaetano Saunders RN  Medication Review/Management: medications reviewed  Taken 10/5/2024 0400 by Gaetano Saunders RN  Medication Review/Management: medications reviewed  Taken 10/5/2024 0153 by Gaetano Saunders RN  Medication Review/Management: medications reviewed  Taken 10/4/2024 2353 by Gaetano Saunders RN  Medication Review/Management: medications reviewed  Taken 10/4/2024 2154 by Gaetano Saunders RN  Medication Review/Management: medications reviewed  Taken 10/4/2024 2002 by Gaetano Saunders RN  Medication Review/Management: medications reviewed     Problem: Skin Injury Risk Increased  Goal: Skin Health and Integrity  Outcome: Progressing  Intervention: Optimize Skin Protection  Recent Flowsheet Documentation  Taken 10/5/2024 0603 by Gaetano Saunders RN  Pressure Reduction Techniques: frequent weight shift encouraged  Head of Bed (HOB) Positioning: [facility] elevated  Pressure Reduction Devices:   foam padding utilized   heel offloading device utilized  Skin Protection: adhesive use limited  Taken 10/5/2024 0400 by Gaetano Saunders RN  Pressure Reduction Techniques: frequent weight shift encouraged  Head of Bed (HOB) Positioning: [facility] elevated  Pressure Reduction Devices:   foam padding utilized   heel offloading device utilized  Skin Protection: adhesive use limited  Taken 10/5/2024 0153 by Gaetano Saunders RN  Pressure Reduction Techniques: frequent weight shift encouraged  Head of Bed (HOB) Positioning: [facility] elevated  Pressure Reduction Devices:   foam padding utilized   heel offloading device utilized  Skin Protection: adhesive use limited  Taken 10/4/2024 2353 by Gaetano Saunders RN  Pressure Reduction Techniques: frequent weight shift encouraged  Head of Bed (HOB)  Positioning: [facility] elevated  Pressure Reduction Devices:   foam padding utilized   heel offloading device utilized  Skin Protection: adhesive use limited  Taken 10/4/2024 2154 by Gaetano Saunders, RN  Pressure Reduction Techniques: frequent weight shift encouraged  Head of Bed ([facility]) Positioning: [facility] elevated  Pressure Reduction Devices:   foam padding utilized   heel offloading device utilized  Skin Protection: adhesive use limited  Taken 10/4/2024 2002 by Gaetano Saunders, RN  Pressure Reduction Techniques: frequent weight shift encouraged  Head of Bed ([facility]) Positioning: [facility] elevated  Pressure Reduction Devices:   foam padding utilized   heel offloading device utilized  Skin Protection: adhesive use limited   Goal Outcome Evaluation:  Plan of Care Reviewed With: patient        Progress: improving

## 2024-11-18 ENCOUNTER — OFFICE VISIT (OUTPATIENT)
Dept: CARDIOLOGY | Facility: CLINIC | Age: 89
End: 2024-11-18
Payer: MEDICARE

## 2024-11-18 VITALS
HEIGHT: 67 IN | SYSTOLIC BLOOD PRESSURE: 151 MMHG | DIASTOLIC BLOOD PRESSURE: 81 MMHG | HEART RATE: 67 BPM | WEIGHT: 184 LBS | BODY MASS INDEX: 28.88 KG/M2 | OXYGEN SATURATION: 94 %

## 2024-11-18 DIAGNOSIS — I25.10 CORONARY ARTERY DISEASE INVOLVING NATIVE CORONARY ARTERY OF NATIVE HEART WITHOUT ANGINA PECTORIS: ICD-10-CM

## 2024-11-18 DIAGNOSIS — I48.0 PAROXYSMAL ATRIAL FIBRILLATION: Primary | ICD-10-CM

## 2024-11-18 DIAGNOSIS — I10 HYPERTENSION, ESSENTIAL: ICD-10-CM

## 2024-11-18 PROCEDURE — 99214 OFFICE O/P EST MOD 30 MIN: CPT | Performed by: NURSE PRACTITIONER

## 2024-11-18 PROCEDURE — G2211 COMPLEX E/M VISIT ADD ON: HCPCS | Performed by: NURSE PRACTITIONER

## 2024-11-18 PROCEDURE — 1160F RVW MEDS BY RX/DR IN RCRD: CPT | Performed by: NURSE PRACTITIONER

## 2024-11-18 PROCEDURE — 1159F MED LIST DOCD IN RCRD: CPT | Performed by: NURSE PRACTITIONER

## 2024-11-18 RX ORDER — METHENAMINE HIPPURATE 1000 MG/1
1 TABLET ORAL EVERY 12 HOURS SCHEDULED
COMMUNITY
Start: 2024-11-05

## 2024-11-18 NOTE — PROGRESS NOTES
Chief Complaint  Atrial Fibrillation and Follow-up (1 year, went to hospital 10/2 for syncope)    Subjective            Sol Jung presents to [facility] CARDIOLOGY  History of Present Illness    Ms. Jung is here for follow-up evaluation and management of labile hypertension, paroxysmal atrial fibrillation, and coronary artery disease.  Early last month, she reports waking up 1 morning feeling unwell and her daughter noted her blood pressure very low.  She had a scheduled urology appointment, while in the waiting room she became severely dizzy and lost consciousness briefly.  She was transported to [facility] and observed overnight.  Her EKG showed sinus rhythm with first-degree AV block.  Her echocardiogram showed normal biventricular function, mildly calcified aortic valve with no significant dysfunction.  Diagnosed with probable vasovagal event.    Then she has had no further episodes of syncope.  She is feeling very well.  Her blood pressure has been stable at home.  She denies chest pain, excessive shortness of breath, or palpitations.  She is not on anticoagulation due to personal preference and previous history of bleeding.    PMH  Past Medical History:   Diagnosis Date    Atrial fibrillation     Heart murmur     Hypertension     Myocardial infarction          SURGICALHX  Past Surgical History:   Procedure Laterality Date    ABLATION OF DYSRHYTHMIC FOCUS      CAROTID STENT          SOC  Social History     Socioeconomic History    Marital status:    Tobacco Use    Smoking status: Never    Smokeless tobacco: Never   Vaping Use    Vaping status: Never Used   Substance and Sexual Activity    Alcohol use: Not Currently    Drug use: Never    Sexual activity: Defer         FAMHX  Family History   Problem Relation Age of Onset    No Known Problems Mother     No Known Problems Father           ALLERGY  No Known Allergies     MEDSCURRENT    Current Outpatient  "Medications:     amitriptyline (ELAVIL) 10 MG tablet, Take 1 tablet by mouth every night at bedtime., Disp: , Rfl:     amLODIPine (NORVASC) 5 MG tablet, Take 1 tablet by mouth Daily. (Patient taking differently: Take 2 tablets by mouth Daily.), Disp: , Rfl:     Cholecalciferol 25 MCG (1000 UT) tablet, Take 1 tablet by mouth Daily. OTC, Disp: , Rfl:     citalopram (CeleXA) 10 MG tablet, Take 1 tablet by mouth Every Morning., Disp: , Rfl:     clopidogrel (PLAVIX) 75 MG tablet, Take 1 tablet by mouth Daily., Disp: 90 tablet, Rfl: 3    famotidine (PEPCID) 40 MG tablet, Take 1 tablet by mouth Daily., Disp: , Rfl:     isosorbide mononitrate (IMDUR) 60 MG 24 hr tablet, Take 1 tablet by mouth Daily., Disp: 90 tablet, Rfl: 3    lisinopril (PRINIVIL,ZESTRIL) 20 MG tablet, Take 1 tablet by mouth Daily., Disp: , Rfl:     magnesium cl-calcium carbonate (Slow-Mag) 71.5-119 MG tablet delayed-release tablet, Take 1 tablet by mouth Daily., Disp: , Rfl:     methenamine (HIPREX) 1 g tablet, Take 1 tablet by mouth Every 12 (Twelve) Hours., Disp: , Rfl:     mirtazapine (REMERON SOL-TAB) 15 MG disintegrating tablet, Take 1 tablet by mouth Every Night., Disp: , Rfl:     nitroglycerin (Nitrostat) 0.4 MG SL tablet, Take 1 tablet by mouth Every 5 (Five) Minutes As Needed for Chest Pain., Disp: , Rfl:     pantoprazole (PROTONIX) 40 MG EC tablet, Take 1 tablet by mouth Daily., Disp: , Rfl:     potassium chloride (MICRO-K) 10 MEQ CR capsule, Take 1 capsule by mouth Daily., Disp: , Rfl:     sotalol (BETAPACE) 80 MG tablet, Take 0.5 tablets by mouth 2 (Two) Times a Day. (Patient taking differently: Take 1 tablet by mouth 2 (Two) Times a Day.), Disp: , Rfl:     vitamin B-12 (CYANOCOBALAMIN) 100 MCG tablet, Take 1 tablet by mouth Daily. OTC, Disp: , Rfl:       Review of Systems   Cardiovascular:  Positive for syncope. Negative for chest pain, dyspnea on exertion and palpitations.        Objective     /81   Pulse 67   Ht 170.2 cm (67\")   Wt " 83.5 kg (184 lb)   SpO2 94%   BMI 28.82 kg/m²       General Appearance:   well developed  well nourished  HENT:   oropharynx moist  lips not cyanotic  Neck:  thyroid not enlarged  supple  Respiratory:  no respiratory distress  normal breath sounds  no rales  Cardiovascular:  no jugular venous distention  regular rhythm  apical impulse normal  S1 normal, S2 normal  no S3, no S4   no murmur  no rub, no thrill  carotid pulses normal; no bruit  pedal pulses normal  lower extremity edema: none    Musculoskeletal:  no clubbing of fingers.   normocephalic, head atraumatic  Skin:   warm, dry  Psychiatric:  judgement and insight appropriate  normal mood and affect      Result Review :     The following data was reviewed by: GABRIELA Crespo on 11/18/2024:    CMP          10/4/2024    13:13 10/5/2024    13:16   CMP   Glucose 102  102    BUN 24  18    Creatinine 1.27  1.13    EGFR 39.8  45.7    Sodium 129  132    Potassium 4.2  3.7    Chloride 97  104    Calcium 9.3  8.5    Total Protein 7.7  6.4    Albumin 4.2  3.6    Globulin 3.5  2.8    Total Bilirubin 0.9  0.6    Alkaline Phosphatase 58  45    AST (SGOT) 27  20    ALT (SGPT) 21  15    Albumin/Globulin Ratio 1.2  1.3    BUN/Creatinine Ratio 18.9  15.9    Anion Gap 8.0  4.0      CBC          10/4/2024    13:13 10/5/2024    13:16   CBC   WBC 7.73  5.57    RBC 4.14  3.55    Hemoglobin 14.1  12.1    Hematocrit 39.8  35.2    MCV 96.1  99.2    MCH 34.1  34.1    MCHC 35.4  34.4    RDW 12.1  12.2    Platelets 222  187            Data reviewed : Hospital records reviewed      Procedures      Sol Jung  reports that she has never smoked. She has never used smokeless tobacco. I have educated her on the risk of diseases from using tobacco products such as cancer, COPD, and heart disease.                   Assessment and Plan        ASSESSMENT:  Encounter Diagnoses   Name Primary?    Paroxysmal atrial fibrillation Yes    Coronary artery disease involving native  coronary artery of native heart without angina pectoris     Hypertension, essential          PLAN:    1.  Recent syncope consistent with vasovagal episode, no further events.  Inpatient cardiac workup unremarkable.  No further testing necessary currently unless symptoms recur.  2.  Essential hypertension, labile.  Mildly elevated today.  Normal readings at home.  Continue current medical therapy.  3.  Coronary artery disease, clinically stable without angina.  Continue current medical therapy.  4.  Paroxysmal atrial fibrillation, maintaining sinus rhythm on sotalol.  Continue.  Not on anticoagulation due to personal preference and previous bleeding.    Follow-up annually unless problems arise.        Patient was given instructions and counseling regarding her condition or for health maintenance advice. Please see specific information pulled into the AVS if appropriate.           Patty Martinez, APRN   11/18/2024  11:34 EST

## 2025-01-24 ENCOUNTER — OFFICE VISIT (OUTPATIENT)
Dept: CARDIOLOGY | Facility: CLINIC | Age: OVER 89
End: 2025-01-24
Payer: MEDICARE

## 2025-01-24 VITALS
HEART RATE: 74 BPM | SYSTOLIC BLOOD PRESSURE: 137 MMHG | BODY MASS INDEX: 29.6 KG/M2 | HEIGHT: 67 IN | DIASTOLIC BLOOD PRESSURE: 69 MMHG | OXYGEN SATURATION: 94 % | WEIGHT: 188.6 LBS

## 2025-01-24 DIAGNOSIS — I50.33 ACUTE ON CHRONIC HEART FAILURE WITH PRESERVED EJECTION FRACTION (HFPEF): ICD-10-CM

## 2025-01-24 DIAGNOSIS — I25.10 CORONARY ARTERY DISEASE INVOLVING NATIVE CORONARY ARTERY OF NATIVE HEART WITHOUT ANGINA PECTORIS: ICD-10-CM

## 2025-01-24 DIAGNOSIS — I10 HYPERTENSION, ESSENTIAL: ICD-10-CM

## 2025-01-24 DIAGNOSIS — I48.0 PAROXYSMAL ATRIAL FIBRILLATION: Primary | ICD-10-CM

## 2025-01-24 RX ORDER — FUROSEMIDE 40 MG/1
1 TABLET ORAL DAILY
COMMUNITY
Start: 2025-01-17 | End: 2025-01-24 | Stop reason: SDUPTHER

## 2025-01-24 RX ORDER — FUROSEMIDE 40 MG/1
40 TABLET ORAL DAILY
Qty: 90 TABLET | Refills: 3 | Status: SHIPPED | OUTPATIENT
Start: 2025-01-24

## 2025-01-24 NOTE — PROGRESS NOTES
Chief Complaint  Atrial Fibrillation and Hospital Follow Up Visit (CHF)    Subjective            Sol Jung presents to [facility] CARDIOLOGY  History of Present Illness    Ms. Jung is here for follow-up evaluation and management of labile hypertension, paroxysmal atrial fibrillation, coronary artery disease.  She was hospitalized recently.  She was at her primary care provider's office and was noted to have elevated BNP and shortness of breath.  She was diuresed and symptoms improved.  Norvasc stopped.  Her last echo from fall 2024 showed normal ejection fraction, diastolic dysfunction.  Mildly calcified aortic valve.    Currently she is feeling much better.  Her swelling and weight gain has improved.  She is still up a few pounds from her baseline.  She is having a good diuretic effect with 40 mg of Lasix a day.  Her blood pressure has been stable.  No chest pain.    PMH  Past Medical History:   Diagnosis Date    Atrial fibrillation     Heart murmur     Hypertension     Myocardial infarction          SURGICALHX  Past Surgical History:   Procedure Laterality Date    ABLATION OF DYSRHYTHMIC FOCUS      CAROTID STENT          SOC  Social History     Socioeconomic History    Marital status:    Tobacco Use    Smoking status: Never    Smokeless tobacco: Never   Vaping Use    Vaping status: Never Used   Substance and Sexual Activity    Alcohol use: Not Currently    Drug use: Never    Sexual activity: Defer         FAMHX  Family History   Problem Relation Age of Onset    No Known Problems Mother     No Known Problems Father           ALLERGY  No Known Allergies     MEDSCURRENT    Current Outpatient Medications:     amitriptyline (ELAVIL) 10 MG tablet, Take 1 tablet by mouth every night at bedtime., Disp: , Rfl:     Cholecalciferol 25 MCG (1000 UT) tablet, Take 1 tablet by mouth Daily. OTC, Disp: , Rfl:     citalopram (CeleXA) 10 MG tablet, Take 1 tablet by mouth Every Morning., Disp: ,  "Rfl:     clopidogrel (PLAVIX) 75 MG tablet, Take 1 tablet by mouth Daily., Disp: 90 tablet, Rfl: 3    docusate sodium (COLACE) 50 MG capsule, Take 1 capsule by mouth 2 (Two) Times a Day., Disp: , Rfl:     famotidine (PEPCID) 40 MG tablet, Take 1 tablet by mouth Daily., Disp: , Rfl:     furosemide (LASIX) 40 MG tablet, Take 1 tablet by mouth Daily., Disp: 90 tablet, Rfl: 3    isosorbide mononitrate (IMDUR) 60 MG 24 hr tablet, Take 1 tablet by mouth Daily., Disp: 90 tablet, Rfl: 3    lisinopril (PRINIVIL,ZESTRIL) 20 MG tablet, Take 1 tablet by mouth Daily., Disp: , Rfl:     magnesium cl-calcium carbonate (Slow-Mag) 71.5-119 MG tablet delayed-release tablet, Take 1 tablet by mouth Daily., Disp: , Rfl:     methenamine (HIPREX) 1 g tablet, Take 1 tablet by mouth Every 12 (Twelve) Hours., Disp: , Rfl:     mirtazapine (REMERON SOL-TAB) 15 MG disintegrating tablet, Take 1 tablet by mouth Every Night., Disp: , Rfl:     nitroglycerin (Nitrostat) 0.4 MG SL tablet, Take 1 tablet by mouth Every 5 (Five) Minutes As Needed for Chest Pain., Disp: , Rfl:     pantoprazole (PROTONIX) 40 MG EC tablet, Take 1 tablet by mouth Daily., Disp: , Rfl:     potassium chloride (MICRO-K) 10 MEQ CR capsule, Take 1 capsule by mouth Daily., Disp: , Rfl:     sotalol (BETAPACE) 80 MG tablet, Take 0.5 tablets by mouth 2 (Two) Times a Day. (Patient taking differently: Take 1 tablet by mouth 2 (Two) Times a Day.), Disp: , Rfl:     vitamin B-12 (CYANOCOBALAMIN) 100 MCG tablet, Take 1 tablet by mouth Daily. OTC, Disp: , Rfl:       Review of Systems   Constitutional: Positive for weight gain.   Cardiovascular:  Positive for leg swelling. Negative for chest pain.   Respiratory:  Positive for shortness of breath.         Objective     /69   Pulse 74   Ht 170.2 cm (67\")   Wt 85.5 kg (188 lb 9.6 oz)   SpO2 94%   BMI 29.54 kg/m²       General Appearance:   well developed  well nourished  HENT:   oropharynx moist  lips not cyanotic  Neck:  thyroid not " enlarged  supple  Respiratory:  no respiratory distress  normal breath sounds  no rales  Cardiovascular:  no jugular venous distention  regular rhythm  apical impulse normal  S1 normal, S2 normal  no S3, no S4   no murmur  no rub, no thrill  carotid pulses normal; no bruit  pedal pulses normal  lower extremity edema: 1+  Musculoskeletal:  no clubbing of fingers.   normocephalic, head atraumatic  Skin:   warm, dry  Psychiatric:  judgement and insight appropriate  normal mood and affect      Result Review :     The following data was reviewed by: GABRIELA Crespo on 01/24/2025:    CMP          10/4/2024    13:13 10/5/2024    13:16   CMP   Glucose 102  102    BUN 24  18    Creatinine 1.27  1.13    EGFR 39.8  45.7    Sodium 129  132    Potassium 4.2  3.7    Chloride 97  104    Calcium 9.3  8.5    Total Protein 7.7  6.4    Albumin 4.2  3.6    Globulin 3.5  2.8    Total Bilirubin 0.9  0.6    Alkaline Phosphatase 58  45    AST (SGOT) 27  20    ALT (SGPT) 21  15    Albumin/Globulin Ratio 1.2  1.3    BUN/Creatinine Ratio 18.9  15.9    Anion Gap 8.0  4.0      CBC          10/4/2024    13:13 10/5/2024    13:16   CBC   WBC 7.73  5.57    RBC 4.14  3.55    Hemoglobin 14.1  12.1    Hematocrit 39.8  35.2    MCV 96.1  99.2    MCH 34.1  34.1    MCHC 35.4  34.4    RDW 12.1  12.2    Platelets 222  187            Data reviewed : Cardiology studies Hospital records reviewed.      Procedures      Sol Jung  reports that she has never smoked. She has never used smokeless tobacco. I have educated her on the risk of diseases from using tobacco products such as cancer, COPD, and heart disease.              Assessment and Plan        ASSESSMENT:  Encounter Diagnoses   Name Primary?    Paroxysmal atrial fibrillation Yes    Coronary artery disease involving native coronary artery of native heart without angina pectoris     Hypertension, essential     Acute on chronic heart failure with preserved ejection fraction (HFpEF)           PLAN:    1.  Recent hospital admission due to heart failure exacerbation.  Her ejection fraction is preserved per recent echocardiogram.  She is doing better on Lasix however is still several pounds above baseline weight and has some edema on exam.  I have instructed her to increase her Lasix for 3 days and then resume 40 mg daily dose.  2.  Essential hypertension, stable.  Continue current medical therapy.  3.  Coronary artery disease, stable.  Not having any angina currently.  Continue current medical therapy.  4.  Paroxysmal atrial fibrillation, clinically maintaining sinus rhythm on low-dose sotalol.  Not on anticoagulation due to personal preference and previous bleeding.    Follow-up as scheduled unless problems arise.      Patient was given instructions and counseling regarding her condition or for health maintenance advice. Please see specific information pulled into the AVS if appropriate.           Patty Martinez, APRN   1/24/2025  12:32 EST

## 2025-04-02 RX ORDER — CLOPIDOGREL BISULFATE 75 MG/1
75 TABLET ORAL DAILY
Qty: 90 TABLET | Refills: 3 | Status: SHIPPED | OUTPATIENT
Start: 2025-04-02

## 2025-05-22 RX ORDER — SOTALOL HYDROCHLORIDE 80 MG/1
40 TABLET ORAL 2 TIMES DAILY
Qty: 90 TABLET | Refills: 3 | Status: SHIPPED | OUTPATIENT
Start: 2025-05-22

## 2025-05-22 NOTE — TELEPHONE ENCOUNTER
Rx Refill Note  Requested Prescriptions     Pending Prescriptions Disp Refills    sotalol (BETAPACE) 80 MG tablet 90 tablet 3     Sig: Take 0.5 tablets by mouth 2 (Two) Times a Day.        LAST OFFICE VISIT:  1/24/2025     NEXT OFFICE VISIT:  11/18/2025     Does the medication requests match the last office note:    [x] Yes   [] No    Does this refill request meet protocol details for MA to approve:     [x] Yes   [] No   [] No Protocols Provided    Pt's daughter called to request refill and verified pt is taking 40 mgs BID.

## 2025-08-31 ENCOUNTER — DOCUMENTATION (OUTPATIENT)
Dept: INTERNAL MEDICINE | Facility: HOSPITAL | Age: OVER 89
End: 2025-08-31
Payer: MEDICARE

## 2025-08-31 PROBLEM — I50.33 ACUTE ON CHRONIC DIASTOLIC HEART FAILURE WITH PRESERVED EJECTION FRACTION: Status: ACTIVE | Noted: 2025-08-31

## 2025-08-31 PROBLEM — I50.9 CONGESTIVE HEART FAILURE: Status: ACTIVE | Noted: 2025-08-31
